# Patient Record
Sex: FEMALE | Race: BLACK OR AFRICAN AMERICAN | NOT HISPANIC OR LATINO | URBAN - METROPOLITAN AREA
[De-identification: names, ages, dates, MRNs, and addresses within clinical notes are randomized per-mention and may not be internally consistent; named-entity substitution may affect disease eponyms.]

---

## 2017-12-23 ENCOUNTER — INPATIENT (INPATIENT)
Facility: HOSPITAL | Age: 57
LOS: 2 days | Discharge: HOME HEALTH | DRG: 25 | End: 2017-12-26
Attending: NEUROLOGICAL SURGERY | Admitting: NEUROLOGICAL SURGERY
Payer: COMMERCIAL

## 2017-12-23 ENCOUNTER — TRANSCRIPTION ENCOUNTER (OUTPATIENT)
Age: 57
End: 2017-12-23

## 2017-12-23 VITALS
OXYGEN SATURATION: 100 % | TEMPERATURE: 98 F | HEART RATE: 75 BPM | SYSTOLIC BLOOD PRESSURE: 120 MMHG | DIASTOLIC BLOOD PRESSURE: 90 MMHG | RESPIRATION RATE: 18 BRPM

## 2017-12-23 DIAGNOSIS — I62.00 NONTRAUMATIC SUBDURAL HEMORRHAGE, UNSPECIFIED: ICD-10-CM

## 2017-12-23 LAB
ALBUMIN SERPL ELPH-MCNC: 4 G/DL — SIGNIFICANT CHANGE UP (ref 3.3–5)
ALP SERPL-CCNC: 55 U/L — SIGNIFICANT CHANGE UP (ref 40–120)
ALT FLD-CCNC: 14 U/L RC — SIGNIFICANT CHANGE UP (ref 10–45)
ANION GAP SERPL CALC-SCNC: 10 MMOL/L — SIGNIFICANT CHANGE UP (ref 5–17)
ANION GAP SERPL CALC-SCNC: 13 MMOL/L — SIGNIFICANT CHANGE UP (ref 5–17)
APTT BLD: 29.9 SEC — SIGNIFICANT CHANGE UP (ref 27.5–37.4)
AST SERPL-CCNC: 15 U/L — SIGNIFICANT CHANGE UP (ref 10–40)
BASOPHILS # BLD AUTO: 0 K/UL — SIGNIFICANT CHANGE UP (ref 0–0.2)
BASOPHILS NFR BLD AUTO: 0.4 % — SIGNIFICANT CHANGE UP (ref 0–2)
BILIRUB SERPL-MCNC: 0.3 MG/DL — SIGNIFICANT CHANGE UP (ref 0.2–1.2)
BLD GP AB SCN SERPL QL: NEGATIVE — SIGNIFICANT CHANGE UP
BUN SERPL-MCNC: 15 MG/DL — SIGNIFICANT CHANGE UP (ref 7–23)
BUN SERPL-MCNC: 16 MG/DL — SIGNIFICANT CHANGE UP (ref 7–23)
CALCIUM SERPL-MCNC: 9 MG/DL — SIGNIFICANT CHANGE UP (ref 8.4–10.5)
CALCIUM SERPL-MCNC: 9.5 MG/DL — SIGNIFICANT CHANGE UP (ref 8.4–10.5)
CHLORIDE SERPL-SCNC: 105 MMOL/L — SIGNIFICANT CHANGE UP (ref 96–108)
CHLORIDE SERPL-SCNC: 105 MMOL/L — SIGNIFICANT CHANGE UP (ref 96–108)
CO2 SERPL-SCNC: 23 MMOL/L — SIGNIFICANT CHANGE UP (ref 22–31)
CO2 SERPL-SCNC: 26 MMOL/L — SIGNIFICANT CHANGE UP (ref 22–31)
CREAT SERPL-MCNC: 0.87 MG/DL — SIGNIFICANT CHANGE UP (ref 0.5–1.3)
CREAT SERPL-MCNC: 0.98 MG/DL — SIGNIFICANT CHANGE UP (ref 0.5–1.3)
EOSINOPHIL # BLD AUTO: 0 K/UL — SIGNIFICANT CHANGE UP (ref 0–0.5)
EOSINOPHIL NFR BLD AUTO: 0.1 % — SIGNIFICANT CHANGE UP (ref 0–6)
GLUCOSE SERPL-MCNC: 162 MG/DL — HIGH (ref 70–99)
GLUCOSE SERPL-MCNC: 164 MG/DL — HIGH (ref 70–99)
HCG SERPL-ACNC: <2 MIU/ML — LOW (ref 5–24)
HCT VFR BLD CALC: 35.6 % — SIGNIFICANT CHANGE UP (ref 34.5–45)
HCT VFR BLD CALC: 37.2 % — SIGNIFICANT CHANGE UP (ref 34.5–45)
HGB BLD-MCNC: 12.4 G/DL — SIGNIFICANT CHANGE UP (ref 11.5–15.5)
HGB BLD-MCNC: 12.4 G/DL — SIGNIFICANT CHANGE UP (ref 11.5–15.5)
INR BLD: 1.17 RATIO — HIGH (ref 0.88–1.16)
LYMPHOCYTES # BLD AUTO: 2.3 K/UL — SIGNIFICANT CHANGE UP (ref 1–3.3)
LYMPHOCYTES # BLD AUTO: 24.8 % — SIGNIFICANT CHANGE UP (ref 13–44)
MAGNESIUM SERPL-MCNC: 1.8 MG/DL — SIGNIFICANT CHANGE UP (ref 1.6–2.6)
MCHC RBC-ENTMCNC: 30.1 PG — SIGNIFICANT CHANGE UP (ref 27–34)
MCHC RBC-ENTMCNC: 31.6 PG — SIGNIFICANT CHANGE UP (ref 27–34)
MCHC RBC-ENTMCNC: 33.4 GM/DL — SIGNIFICANT CHANGE UP (ref 32–36)
MCHC RBC-ENTMCNC: 34.9 GM/DL — SIGNIFICANT CHANGE UP (ref 32–36)
MCV RBC AUTO: 90.1 FL — SIGNIFICANT CHANGE UP (ref 80–100)
MCV RBC AUTO: 90.6 FL — SIGNIFICANT CHANGE UP (ref 80–100)
MONOCYTES # BLD AUTO: 0.6 K/UL — SIGNIFICANT CHANGE UP (ref 0–0.9)
MONOCYTES NFR BLD AUTO: 6.2 % — SIGNIFICANT CHANGE UP (ref 2–14)
NEUTROPHILS # BLD AUTO: 6.3 K/UL — SIGNIFICANT CHANGE UP (ref 1.8–7.4)
NEUTROPHILS NFR BLD AUTO: 68.4 % — SIGNIFICANT CHANGE UP (ref 43–77)
PHOSPHATE SERPL-MCNC: 3.3 MG/DL — SIGNIFICANT CHANGE UP (ref 2.5–4.5)
PLATELET # BLD AUTO: 260 K/UL — SIGNIFICANT CHANGE UP (ref 150–400)
PLATELET # BLD AUTO: 301 K/UL — SIGNIFICANT CHANGE UP (ref 150–400)
POTASSIUM SERPL-MCNC: 3.7 MMOL/L — SIGNIFICANT CHANGE UP (ref 3.5–5.3)
POTASSIUM SERPL-MCNC: 3.8 MMOL/L — SIGNIFICANT CHANGE UP (ref 3.5–5.3)
POTASSIUM SERPL-SCNC: 3.7 MMOL/L — SIGNIFICANT CHANGE UP (ref 3.5–5.3)
POTASSIUM SERPL-SCNC: 3.8 MMOL/L — SIGNIFICANT CHANGE UP (ref 3.5–5.3)
PROT SERPL-MCNC: 7.9 G/DL — SIGNIFICANT CHANGE UP (ref 6–8.3)
PROTHROM AB SERPL-ACNC: 12.8 SEC — HIGH (ref 9.8–12.7)
RBC # BLD: 3.93 M/UL — SIGNIFICANT CHANGE UP (ref 3.8–5.2)
RBC # BLD: 4.13 M/UL — SIGNIFICANT CHANGE UP (ref 3.8–5.2)
RBC # FLD: 13.1 % — SIGNIFICANT CHANGE UP (ref 10.3–14.5)
RBC # FLD: 13.2 % — SIGNIFICANT CHANGE UP (ref 10.3–14.5)
RH IG SCN BLD-IMP: POSITIVE — SIGNIFICANT CHANGE UP
SODIUM SERPL-SCNC: 141 MMOL/L — SIGNIFICANT CHANGE UP (ref 135–145)
SODIUM SERPL-SCNC: 141 MMOL/L — SIGNIFICANT CHANGE UP (ref 135–145)
WBC # BLD: 12.3 K/UL — HIGH (ref 3.8–10.5)
WBC # BLD: 9.2 K/UL — SIGNIFICANT CHANGE UP (ref 3.8–10.5)
WBC # FLD AUTO: 12.3 K/UL — HIGH (ref 3.8–10.5)
WBC # FLD AUTO: 9.2 K/UL — SIGNIFICANT CHANGE UP (ref 3.8–10.5)

## 2017-12-23 PROCEDURE — 99291 CRITICAL CARE FIRST HOUR: CPT

## 2017-12-23 PROCEDURE — 70496 CT ANGIOGRAPHY HEAD: CPT | Mod: 26

## 2017-12-23 PROCEDURE — 93010 ELECTROCARDIOGRAM REPORT: CPT

## 2017-12-23 PROCEDURE — 99233 SBSQ HOSP IP/OBS HIGH 50: CPT

## 2017-12-23 PROCEDURE — 61312 CRNEC/CRNOT STTL XDRL/SDRL: CPT

## 2017-12-23 PROCEDURE — 70450 CT HEAD/BRAIN W/O DYE: CPT | Mod: 26,59

## 2017-12-23 PROCEDURE — 99285 EMERGENCY DEPT VISIT HI MDM: CPT | Mod: 25

## 2017-12-23 RX ORDER — DEXTROSE MONOHYDRATE, SODIUM CHLORIDE, AND POTASSIUM CHLORIDE 50; .745; 4.5 G/1000ML; G/1000ML; G/1000ML
1000 INJECTION, SOLUTION INTRAVENOUS
Refills: 0 | Status: DISCONTINUED | OUTPATIENT
Start: 2017-12-23 | End: 2017-12-24

## 2017-12-23 RX ORDER — ATORVASTATIN CALCIUM 80 MG/1
10 TABLET, FILM COATED ORAL AT BEDTIME
Refills: 0 | Status: DISCONTINUED | OUTPATIENT
Start: 2017-12-23 | End: 2017-12-26

## 2017-12-23 RX ORDER — HYDRALAZINE HCL 50 MG
5 TABLET ORAL ONCE
Refills: 0 | Status: COMPLETED | OUTPATIENT
Start: 2017-12-23 | End: 2017-12-23

## 2017-12-23 RX ORDER — MAGNESIUM SULFATE 500 MG/ML
1 VIAL (ML) INJECTION ONCE
Refills: 0 | Status: COMPLETED | OUTPATIENT
Start: 2017-12-23 | End: 2017-12-24

## 2017-12-23 RX ORDER — DESMOPRESSIN ACETATE 0.1 MG/1
20 TABLET ORAL ONCE
Refills: 0 | Status: COMPLETED | OUTPATIENT
Start: 2017-12-23 | End: 2017-12-23

## 2017-12-23 RX ORDER — HYDRALAZINE HCL 50 MG
5 TABLET ORAL ONCE
Refills: 0 | Status: DISCONTINUED | OUTPATIENT
Start: 2017-12-23 | End: 2017-12-23

## 2017-12-23 RX ORDER — SENNA PLUS 8.6 MG/1
2 TABLET ORAL AT BEDTIME
Refills: 0 | Status: DISCONTINUED | OUTPATIENT
Start: 2017-12-23 | End: 2017-12-26

## 2017-12-23 RX ORDER — OXYCODONE HYDROCHLORIDE 5 MG/1
10 TABLET ORAL EVERY 4 HOURS
Refills: 0 | Status: DISCONTINUED | OUTPATIENT
Start: 2017-12-23 | End: 2017-12-26

## 2017-12-23 RX ORDER — INFLUENZA VIRUS VACCINE 15; 15; 15; 15 UG/.5ML; UG/.5ML; UG/.5ML; UG/.5ML
0.5 SUSPENSION INTRAMUSCULAR ONCE
Refills: 0 | Status: DISCONTINUED | OUTPATIENT
Start: 2017-12-23 | End: 2017-12-26

## 2017-12-23 RX ORDER — OXYCODONE HYDROCHLORIDE 5 MG/1
5 TABLET ORAL EVERY 4 HOURS
Refills: 0 | Status: DISCONTINUED | OUTPATIENT
Start: 2017-12-23 | End: 2017-12-26

## 2017-12-23 RX ORDER — HYDROCHLOROTHIAZIDE 25 MG
12.5 TABLET ORAL DAILY
Refills: 0 | Status: DISCONTINUED | OUTPATIENT
Start: 2017-12-23 | End: 2017-12-23

## 2017-12-23 RX ORDER — NICARDIPINE HYDROCHLORIDE 30 MG/1
3 CAPSULE, EXTENDED RELEASE ORAL
Qty: 40 | Refills: 0 | Status: DISCONTINUED | OUTPATIENT
Start: 2017-12-23 | End: 2017-12-24

## 2017-12-23 RX ORDER — ATENOLOL 25 MG/1
25 TABLET ORAL DAILY
Refills: 0 | Status: DISCONTINUED | OUTPATIENT
Start: 2017-12-23 | End: 2017-12-23

## 2017-12-23 RX ORDER — LEVETIRACETAM 250 MG/1
500 TABLET, FILM COATED ORAL EVERY 12 HOURS
Refills: 0 | Status: DISCONTINUED | OUTPATIENT
Start: 2017-12-23 | End: 2017-12-26

## 2017-12-23 RX ORDER — DOCUSATE SODIUM 100 MG
100 CAPSULE ORAL DAILY
Refills: 0 | Status: DISCONTINUED | OUTPATIENT
Start: 2017-12-23 | End: 2017-12-26

## 2017-12-23 RX ORDER — ATENOLOL 25 MG/1
25 TABLET ORAL DAILY
Refills: 0 | Status: DISCONTINUED | OUTPATIENT
Start: 2017-12-23 | End: 2017-12-26

## 2017-12-23 RX ORDER — ACETAMINOPHEN 500 MG
1000 TABLET ORAL ONCE
Refills: 0 | Status: COMPLETED | OUTPATIENT
Start: 2017-12-23 | End: 2017-12-23

## 2017-12-23 RX ORDER — ONDANSETRON 8 MG/1
4 TABLET, FILM COATED ORAL ONCE
Refills: 0 | Status: COMPLETED | OUTPATIENT
Start: 2017-12-23 | End: 2017-12-23

## 2017-12-23 RX ORDER — DESMOPRESSIN ACETATE 0.1 MG/1
20 TABLET ORAL ONCE
Refills: 0 | Status: DISCONTINUED | OUTPATIENT
Start: 2017-12-23 | End: 2017-12-23

## 2017-12-23 RX ADMIN — ATENOLOL 25 MILLIGRAM(S): 25 TABLET ORAL at 16:44

## 2017-12-23 RX ADMIN — OXYCODONE HYDROCHLORIDE 10 MILLIGRAM(S): 5 TABLET ORAL at 23:30

## 2017-12-23 RX ADMIN — Medication 5 MILLIGRAM(S): at 15:15

## 2017-12-23 RX ADMIN — DESMOPRESSIN ACETATE 220 MICROGRAM(S): 0.1 TABLET ORAL at 07:36

## 2017-12-23 RX ADMIN — ONDANSETRON 4 MILLIGRAM(S): 8 TABLET, FILM COATED ORAL at 15:15

## 2017-12-23 RX ADMIN — Medication 100 MILLIGRAM(S): at 14:38

## 2017-12-23 RX ADMIN — ATORVASTATIN CALCIUM 10 MILLIGRAM(S): 80 TABLET, FILM COATED ORAL at 22:01

## 2017-12-23 RX ADMIN — DEXTROSE MONOHYDRATE, SODIUM CHLORIDE, AND POTASSIUM CHLORIDE 75 MILLILITER(S): 50; .745; 4.5 INJECTION, SOLUTION INTRAVENOUS at 18:57

## 2017-12-23 RX ADMIN — Medication 400 MILLIGRAM(S): at 14:31

## 2017-12-23 RX ADMIN — Medication 100 MILLIGRAM(S): at 22:01

## 2017-12-23 RX ADMIN — Medication 100 MILLIGRAM(S): at 17:42

## 2017-12-23 RX ADMIN — Medication 1000 MILLIGRAM(S): at 15:31

## 2017-12-23 RX ADMIN — LEVETIRACETAM 500 MILLIGRAM(S): 250 TABLET, FILM COATED ORAL at 17:42

## 2017-12-23 NOTE — H&P ADULT - PMH
Diabetes mellitus of other type without complication, unspecified long term insulin use status    Essential hypertension    Pure hypercholesterolemia

## 2017-12-23 NOTE — ED PROVIDER NOTE - NOTES
level 1 called on arrival of transfer - downgraded to level 3 - pt denies any trauma - chronic ha - worse x 3days w n/v - no anticoag - nsx called and aware

## 2017-12-23 NOTE — ED PROVIDER NOTE - MEDICAL DECISION MAKING DETAILS
estuardo transfer from [facility] - atraumatic sdh w shift nonfocal exam - no reported trauma, no anticoag, asa only - consider ddavp , repeat ct head - consider cta r/o vasc anom, admit to nsx possib op internvention

## 2017-12-23 NOTE — ED PROVIDER NOTE - OBJECTIVE STATEMENT
christine 57 f w htn , dm ,high chol reportedly noncompliant w bp meds - presents as transfer from [facility] - pt w ha x weeks - worse x 3 days , inc confusion, inc sleeping, ha 10/10 w viomiting last pm - brought to [facility] 2 cm sdh w 1.4 cm shift - denies any trauma , no anticoag, pt taking asa- no numbness or weakness persistant ha bitemporal , pos nauseau vomiting only x1 , no fever

## 2017-12-23 NOTE — H&P ADULT - PROBLEM SELECTOR PLAN 1
Preop for OR  Left craniotomy for SDH  PLT/ DDAVP  CT / CTA (to r/o vascular malformation)  Q1 neurochecks

## 2017-12-23 NOTE — PROGRESS NOTE ADULT - SUBJECTIVE AND OBJECTIVE BOX
LEVEL 1 to 2  TRAUMA ACTIVATION Consult    57y Female transfer from [facility] for an atraumatic SDH.  She has had a 3 day hx of headaches and forgetfulness. The patient has had no prior history of head trauma or loss of consciousness.  At [facility] the patient was found to have a 1.2 SDH with 15 mm midline shift. She has no complaints of pain other than headache.       Primary Survey  A - intact   B - clear to auscultation and equal b/l   C - 120 / 90 HR 74    D - GCS  E - Exposure obtained      Secondary survey  GEN: NAD A/O x 2, alert and oriented to person and place, responses appropriate  HEENT: normocephalic, atraumatic, no outward signs of trauma  CV: s1, s2, RRR  PULM/CHEST: CTA B/L, no crepitus, no obvious signs of trauma  ABD: soft, nontender, nondistended, no hematomas, no abrasions or lacerations  : deferred  EXT: cool feet, others warm, no gross deformities, soft compartments, no abrasions         PMH  Diabetes mellitus of other type without complication, unspecified long term insulin use status  Pure hypercholesterolemia  Essential hypertension  No pertinent past medical history      PSH  No significant past surgical history      MEDS  MEDICATIONS  (STANDING):  MEDICATIONS  (PRN):      ALLERGIES  Allergies    penicillin (Rash)    Intolerances        SOCIAL Hx: noncontributory    LABS                        12.4   9.2   )-----------( 260      ( 23 Dec 2017 07:17 )             37.2     12-23    141  |  105  |  16  ----------------------------<  162<H>  3.8   |  26  |  0.98    Ca    9.5      23 Dec 2017 07:17    TPro  7.9  /  Alb  4.0  /  TBili  0.3  /  DBili  x   /  AST  15  /  ALT  14  /  AlkPhos  55  12-23    PT/INR - ( 23 Dec 2017 07:17 )   PT: 12.8 sec;   INR: 1.17 ratio         PTT - ( 23 Dec 2017 07:17 )  PTT:29.9 sec          IMAGING  < from: CT Angio Head w/ IV Cont (12.23.17 @ 07:31) >  IMPRESSION:     CTABRAIN: Patent intracranial circulation without flow-limiting stenosis   or occlusion.   Moderate stenosis of the left cervical internal carotid artery, partially   imaged.   Retropharyngeal course of the bilateral cervical internal carotid   arteries.    < end of copied text > LEVEL 1 to 2  TRAUMA ACTIVATION Consult    57y Female transfer from [facility] for an atraumatic SDH.  She has had a 3 day hx of headaches and forgetfulness. The patient has had no prior history of head trauma or loss of consciousness.  At [facility] the patient was found to have a 1.2 SDH with 15 mm midline shift. She has no complaints of pain other than headache.       Primary Survey  A - intact   B - clear to auscultation and equal b/l   C - 120 / 90 HR 74    D - GCS 14  E - Exposure obtained      Secondary survey  GEN: NAD A/O x 2, alert and oriented to person and place, responses appropriate  HEENT: normocephalic, atraumatic, no outward signs of trauma  CV: s1, s2, RRR  PULM/CHEST: CTA B/L, no crepitus, no obvious signs of trauma  ABD: soft, nontender, nondistended, no hematomas, no abrasions or lacerations  : deferred  EXT: cool feet, others warm, no gross deformities, soft compartments, no abrasions         PMH  Diabetes mellitus of other type without complication, unspecified long term insulin use status  Pure hypercholesterolemia  Essential hypertension  No pertinent past medical history      PSH  No significant past surgical history      MEDS  MEDICATIONS  (STANDING):  MEDICATIONS  (PRN):      ALLERGIES  Allergies    penicillin (Rash)    Intolerances        SOCIAL Hx: noncontributory    LABS                        12.4   9.2   )-----------( 260      ( 23 Dec 2017 07:17 )             37.2     12-23    141  |  105  |  16  ----------------------------<  162<H>  3.8   |  26  |  0.98    Ca    9.5      23 Dec 2017 07:17    TPro  7.9  /  Alb  4.0  /  TBili  0.3  /  DBili  x   /  AST  15  /  ALT  14  /  AlkPhos  55  12-23    PT/INR - ( 23 Dec 2017 07:17 )   PT: 12.8 sec;   INR: 1.17 ratio         PTT - ( 23 Dec 2017 07:17 )  PTT:29.9 sec          IMAGING  < from: CT Angio Head w/ IV Cont (12.23.17 @ 07:31) >  IMPRESSION:     CTABRAIN: Patent intracranial circulation without flow-limiting stenosis   or occlusion.   Moderate stenosis of the left cervical internal carotid artery, partially   imaged.   Retropharyngeal course of the bilateral cervical internal carotid   arteries.    < end of copied text >

## 2017-12-23 NOTE — CONSULT NOTE ADULT - SUBJECTIVE AND OBJECTIVE BOX
LEVEL 1 to 2  TRAUMA ACTIVATION Consult    57y Female transfer from [facility] for an atraumatic SDH.  She has had a 3 day hx of headaches, lethargic and forgetfulness. The patient has had no prior history of head trauma or loss of consciousness.  At [facility] the patient was found to have a 1.2 SDH with 15 mm midline shift. She has no complaints of pain other than headache. *Shift found to be 17mm on repeat imaging.  Patient takes ASA 81 mg everyday.  Daughter and son are at patient's bedside. Neurosurgery evaluated the patient, DDAVP and platelets given.       Primary Survey  A - intact   B - clear to auscultation and equal b/l   C - 120 / 90 HR 74    D - GCS 15  E - Exposure obtained      Secondary survey  GEN: NAD A/O x 2, alert and oriented to person and place, responses appropriate  HEENT: normocephalic, atraumatic, no outward signs of trauma  CV: s1, s2, RRR  PULM/CHEST: CTA B/L, no crepitus, no obvious signs of trauma  ABD: soft, nontender, nondistended, no hematomas, no abrasions or lacerations  : deferred  EXT: cool feet, others warm, no gross deformities, soft compartments, no abrasions   Neuro: L grift       PMH  Diabetes mellitus of other type without complication, unspecified long term insulin use status  Pure hypercholesterolemia  Essential hypertension  No pertinent past medical history      PSH  No significant past surgical history      MEDS  MEDICATIONS  (STANDING):  MEDICATIONS  (PRN):      ALLERGIES  Allergies    penicillin (Rash)    Intolerances        SOCIAL Hx: noncontributory    LABS                        12.4   9.2   )-----------( 260      ( 23 Dec 2017 07:17 )             37.2     12-23    141  |  105  |  16  ----------------------------<  162<H>  3.8   |  26  |  0.98    Ca    9.5      23 Dec 2017 07:17    TPro  7.9  /  Alb  4.0  /  TBili  0.3  /  DBili  x   /  AST  15  /  ALT  14  /  AlkPhos  55  12-23    PT/INR - ( 23 Dec 2017 07:17 )   PT: 12.8 sec;   INR: 1.17 ratio         PTT - ( 23 Dec 2017 07:17 )  PTT:29.9 sec          IMAGING  < from: CT Angio Head w/ IV Cont (12.23.17 @ 07:31) >  IMPRESSION:     CTABRAIN: Patent intracranial circulation without flow-limiting stenosis   or occlusion.   Moderate stenosis of the left cervical internal carotid artery, partially   imaged.   Retropharyngeal course of the bilateral cervical internal carotid   arteries.    < end of copied text > LEVEL 1 to 3  TRAUMA ACTIVATION Consult    57y Female transfer from [facility] for an atraumatic SDH.  She has had a 3 day hx of moderate headaches, lethargy and forgetfulness. No N/V. The patient has had no prior history of head trauma or loss of consciousness.  At [facility] the patient was found to have a 1.2 SDH with 15 mm midline shift. She has no complaints of pain other than headache. *Shift found to be 17mm on repeat imaging.  Patient takes ASA 81 mg everyday.  Daughter and son are at patient's bedside. Neurosurgery evaluated the patient, DDAVP and platelets given.       Primary Survey  A - intact   B - clear to auscultation and equal b/l   C - 120 / 90 HR 74    D - GCS 15  E - Exposure obtained      Secondary survey  GEN: NAD  Neuro: A/O x 2, alert and oriented to person and place, responses appropriate  HEENT: normocephalic, atraumatic, no outward signs of trauma. Neck: no C-spine tenderness. Pt did not have C-collar on at arrival.  Eyes: EOMI  CV: s1, s2, RRR  PULM/CHEST: CTA B/L, no crepitus, no obvious signs of trauma  ABD: soft, nontender, nondistended, no hematomas, no abrasions or lacerations  EXT: cool feet, others warm, no gross deformities, soft compartments, no abrasions   Neuro: L drift of hands  Psych: normal affect      PMH  Diabetes mellitus of other type without complication, unspecified long term insulin use status  Pure hypercholesterolemia  Essential hypertension      PSH  Neck and back surgery, ORIF LE      MEDS  MEDICATIONS  (STANDING):  MEDICATIONS  (PRN):      ALLERGIES    penicillin (Rash)      SOCIAL Hx: noncontributory    LABS                        12.4   9.2   )-----------( 260      ( 23 Dec 2017 07:17 )             37.2     12-23    141  |  105  |  16  ----------------------------<  162<H>  3.8   |  26  |  0.98    Ca    9.5      23 Dec 2017 07:17    TPro  7.9  /  Alb  4.0  /  TBili  0.3  /  DBili  x   /  AST  15  /  ALT  14  /  AlkPhos  55  12-23    PT/INR - ( 23 Dec 2017 07:17 )   PT: 12.8 sec;   INR: 1.17 ratio         PTT - ( 23 Dec 2017 07:17 )  PTT:29.9 sec          IMAGING  < from: CT Angio Head w/ IV Cont (12.23.17 @ 07:31) >  IMPRESSION:     CTABRAIN: Patent intracranial circulation without flow-limiting stenosis   or occlusion.   Moderate stenosis of the left cervical internal carotid artery, partially   imaged.   Retropharyngeal course of the bilateral cervical internal carotid   arteries.    < end of copied text > LEVEL 1 to 3  TRAUMA ACTIVATION Consult    57y Female transfer from [facility] for an atraumatic SDH.  She has had a 3 day hx of moderate headaches, lethargy and forgetfulness. No N/V. The patient has had no prior history of head trauma or loss of consciousness.  At [facility] the patient was found to have a 1.2 SDH with 15 mm midline shift. She has no complaints of pain other than headache. *Shift found to be 17mm on repeat imaging.  Patient takes ASA 81 mg everyday.  Daughter and son are at patient's bedside. Neurosurgery evaluated the patient, DDAVP and platelets given.       Primary Survey  A - intact   B - clear to auscultation and equal b/l   C - 120 / 90 HR 74    D - GCS 14 (disoriented to time)  E - Exposure obtained      Secondary survey  GEN: NAD  Neuro: A/O x 2, alert and oriented to person and place, responses appropriate  HEENT: normocephalic, atraumatic, no outward signs of trauma. Neck: no C-spine tenderness. Pt did not have C-collar on at arrival.  Eyes: EOMI  CV: s1, s2, RRR  PULM/CHEST: CTA B/L, no crepitus, no obvious signs of trauma  ABD: soft, nontender, nondistended, no hematomas, no abrasions or lacerations  EXT: cool feet, others warm, no gross deformities, soft compartments, no abrasions   Neuro: L drift of hands  Psych: normal affect      PMH  Diabetes mellitus of other type without complication, unspecified long term insulin use status  Pure hypercholesterolemia  Essential hypertension      PSH  Neck and back surgery, ORIF LE      MEDS  MEDICATIONS  (STANDING):  MEDICATIONS  (PRN):      ALLERGIES    penicillin (Rash)      SOCIAL Hx: noncontributory    LABS                        12.4   9.2   )-----------( 260      ( 23 Dec 2017 07:17 )             37.2     12-23    141  |  105  |  16  ----------------------------<  162<H>  3.8   |  26  |  0.98    Ca    9.5      23 Dec 2017 07:17    TPro  7.9  /  Alb  4.0  /  TBili  0.3  /  DBili  x   /  AST  15  /  ALT  14  /  AlkPhos  55  12-23    PT/INR - ( 23 Dec 2017 07:17 )   PT: 12.8 sec;   INR: 1.17 ratio         PTT - ( 23 Dec 2017 07:17 )  PTT:29.9 sec          IMAGING  < from: CT Angio Head w/ IV Cont (12.23.17 @ 07:31) >  IMPRESSION:     CTABRAIN: Patent intracranial circulation without flow-limiting stenosis   or occlusion.   Moderate stenosis of the left cervical internal carotid artery, partially   imaged.   Retropharyngeal course of the bilateral cervical internal carotid   arteries.    < end of copied text >

## 2017-12-23 NOTE — PROGRESS NOTE ADULT - SUBJECTIVE AND OBJECTIVE BOX
HPI:  57 year old female with history of HTN, DM, HLD , bariatric surgery, non-compliant with blood pressure medications presents as transfer from [facility]. Per family has been having headache for last 3 days, increased confusion, forgetfulness that is progressive in nature. Was seen at [facility] with 2 cm left subacute SDH with 1.4cm shift. Patient takes ASA at baseline as prophylaxis. At [facility] ED, patient is alert, pleasant, oriented x 2, is not able to recall date with right weakness. Son and daughter at bedside. Patient to receive DDAVP / PLT. Preop for OR for left craniotomy. Repeat CT / CTA here.     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 Son is . (23 Dec 2017 07:50)    Adm NSCU s/p L crani for subacute SDH evac    VITALS:  T(C): , Max: 36.7 (12-23-17 @ 06:55)  HR:  (75 - 76)  BP:  (120/90 - 146/81)  ABP: --  RR:  (18 - 18)  SpO2:  (100% - 100%)  Wt(kg): --    LABS:  Na: 141 (12-23 @ 07:17)  K: 3.8 (12-23 @ 07:17)  Cl: 105 (12-23 @ 07:17)  CO2: 26 (12-23 @ 07:17)  BUN: 16 (12-23 @ 07:17)  Cr: 0.98 (12-23 @ 07:17)  Glu: 162(12-23 @ 07:17)    Hgb: 12.4 (12-23 @ 07:17)  Hct: 37.2 (12-23 @ 07:17)  WBC: 9.2 (12-23 @ 07:17)  Plt: 260 (12-23 @ 07:17)  PT: 12.8 (12-23 @ 07:17)  INR: 1.17 (12-23 @ 07:17)  aPTT: 29.9 (12-23 @ 07:17)    IMAGING:   Recent imaging studies were reviewed.    MEDICATIONS:  ATENolol  Tablet 25 milliGRAM(s) Oral daily  atorvastatin 10 milliGRAM(s) Oral at bedtime  clindamycin IVPB 300 milliGRAM(s) IV Intermittent every 8 hours  hydrochlorothiazide 12.5 milliGRAM(s) Oral daily  levETIRAcetam 500 milliGRAM(s) Oral every 12 hours  sodium chloride 0.9% with potassium chloride 20 mEq/L 1000 milliLiter(s) IV Continuous <Continuous>    EXAMINATION:  General:  calm  HEENT:  MMM  Neuro:  awake, alert, oriented to name/hospital/year however not the name of the hospital, follows commands, moves all extremities full strength, no drift  Cards:  RRR  Respiratory:  no respiratory distress  Adomen:  soft  Extremities:  no edema  Skin:  warm/dry

## 2017-12-23 NOTE — CONSULT NOTE ADULT - ASSESSMENT
Patient is a 57F with an atraumatic SDH and 17mm shift going to the OR for cranial decompression.    -DDAVP and platelets   -management as per neurosurgery  -nontraumatic injuries  -discussed with Dr. RADHA Cifuentes MD PGYII [id number]

## 2017-12-23 NOTE — PROGRESS NOTE ADULT - ASSESSMENT
Summary: POD#0 L crani for subacute SDH evacuation    NEURO:  q1h neuro checks, CTH in am, monitor SD drain output, keppra for seizure ppx x7d, pain control, no sedation  PT/OT/OOB as tolerated    CARDS:  -150--cont thi emeds    PULM:  sat > 92%    RENAL:  IVL once tolerating adequate PO    GASTRO:  advance as tolerated  ---> Stress ulcer prophylaxis:  PPI not indicated    HEME:  monitor H/H    ---> DVT prophylaxis: SCDs, hold anticoagulation, fresh post op    ENDO:  euglycemia    ID:  afebrile    Code status:  Full code  Disposition:  ICU    This patient was at high risk of neurologic deterioration and/or death due to: reaccumulation of SDH, brain compression    Time spent:  45 minutes

## 2017-12-23 NOTE — H&P ADULT - NSHPPHYSICALEXAM_GEN_ALL_CORE
Alert  Oriented x 2 (unable to recall date)  pupils equal and reactive, 3mm bilaterally  RUE 5/5  LUE 4+/5  Bilateral lowers 4+5 Alert  Oriented x 2 (unable to recall date)  pupils equal and reactive, 3mm bilaterally  RUE 4+/5  LUE 5/5  Bilateral lowers 4+5

## 2017-12-23 NOTE — ED PROVIDER NOTE - PHYSICAL EXAMINATION
setuardo aaox3 bnad ncat perrl eomi gcs15 s1s2 rrrctabl abd soft nt pelv stabel stregth 5/5 uele bl sensation intact - no evidence of trauma on physical exam [id number]

## 2017-12-23 NOTE — CONSULT NOTE ADULT - ATTENDING COMMENTS
Pt seen and examined today at 7am. Initially level one trauma called for large left SDH with 1.7cm shift, but when I arrived, Dr. Carvajal (ED attending) indicated that as there was no traumatic mechanism, he had downgraded this to a trauma consultation only (level 3). Patient was awake and alert, in no distress, with her family at bedside. Airway clear, breathing normal, hemodynamically normal. GCS 15, A&Ox2, PERRL, no lateralizing signs. Full exposure.    CT head/ CTA head repeated. I personally reviewed the images, which demonstrated a large left SDH with 1.7cm shift and evidence of uncal herniation; patient was taken to OR for decompressive craniectomy by Neurosurgery. Unclear cause of SDH as patient and her family were very clear that she had not fallen or hit her head and patient only on ASA, which is extremely unlikely to cause major bleeding spontaneously or with minimal impact). Care per Neurosurgery.

## 2017-12-23 NOTE — H&P ADULT - HISTORY OF PRESENT ILLNESS
57 year old female with history of HTN, DM, HLD , bariatric surgery, non-compliant with blood pressure medications presents as transfer from [facility]. Per family has been having headache for last 3 days, increased confusion, forgetfulness that is progressive in nature. Was seen at [place] with 2 cm left subacute SDH with 1.4cm shift. Patient takes ASA at baseline as prophylaxis. At [facility] ED, patient is alert, pleasant, oriented x 2, is not able to recall date with left weakness. Son and daughter at bedside. Patient to receive DDAVP / PLT. Preop for OR for left craniotomy. Repeat CT / CTA here.     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 Son is . 57 year old female with history of HTN, DM, HLD , bariatric surgery, non-compliant with blood pressure medications presents as transfer from [facility]. Per family has been having headache for last 3 days, increased confusion, forgetfulness that is progressive in nature. Was seen at [place] with 2 cm left subacute SDH with 1.4cm shift. Patient takes ASA at baseline as prophylaxis. At [facility] ED, patient is alert, pleasant, oriented x 2, is not able to recall date with right weakness. Son and daughter at bedside. Patient to receive DDAVP / PLT. Preop for OR for left craniotomy. Repeat CT / CTA here.     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 Son is .

## 2017-12-24 LAB
ANION GAP SERPL CALC-SCNC: 12 MMOL/L — SIGNIFICANT CHANGE UP (ref 5–17)
BUN SERPL-MCNC: 13 MG/DL — SIGNIFICANT CHANGE UP (ref 7–23)
CALCIUM SERPL-MCNC: 8.6 MG/DL — SIGNIFICANT CHANGE UP (ref 8.4–10.5)
CHLORIDE SERPL-SCNC: 106 MMOL/L — SIGNIFICANT CHANGE UP (ref 96–108)
CK MB BLD-MCNC: 0.7 % — SIGNIFICANT CHANGE UP (ref 0–3.5)
CK MB CFR SERPL CALC: 1.5 NG/ML — SIGNIFICANT CHANGE UP (ref 0–3.8)
CK SERPL-CCNC: 216 U/L — HIGH (ref 25–170)
CO2 SERPL-SCNC: 23 MMOL/L — SIGNIFICANT CHANGE UP (ref 22–31)
CREAT SERPL-MCNC: 0.83 MG/DL — SIGNIFICANT CHANGE UP (ref 0.5–1.3)
GLUCOSE BLDC GLUCOMTR-MCNC: 157 MG/DL — HIGH (ref 70–99)
GLUCOSE SERPL-MCNC: 150 MG/DL — HIGH (ref 70–99)
HCT VFR BLD CALC: 34.1 % — LOW (ref 34.5–45)
HGB BLD-MCNC: 11.6 G/DL — SIGNIFICANT CHANGE UP (ref 11.5–15.5)
MAGNESIUM SERPL-MCNC: 2 MG/DL — SIGNIFICANT CHANGE UP (ref 1.6–2.6)
MCHC RBC-ENTMCNC: 30.8 PG — SIGNIFICANT CHANGE UP (ref 27–34)
MCHC RBC-ENTMCNC: 34 GM/DL — SIGNIFICANT CHANGE UP (ref 32–36)
MCV RBC AUTO: 90.6 FL — SIGNIFICANT CHANGE UP (ref 80–100)
PHOSPHATE SERPL-MCNC: 3 MG/DL — SIGNIFICANT CHANGE UP (ref 2.5–4.5)
PLATELET # BLD AUTO: 274 K/UL — SIGNIFICANT CHANGE UP (ref 150–400)
POTASSIUM SERPL-MCNC: 3.8 MMOL/L — SIGNIFICANT CHANGE UP (ref 3.5–5.3)
POTASSIUM SERPL-SCNC: 3.8 MMOL/L — SIGNIFICANT CHANGE UP (ref 3.5–5.3)
RBC # BLD: 3.77 M/UL — LOW (ref 3.8–5.2)
RBC # FLD: 13.3 % — SIGNIFICANT CHANGE UP (ref 10.3–14.5)
SODIUM SERPL-SCNC: 141 MMOL/L — SIGNIFICANT CHANGE UP (ref 135–145)
TROPONIN T SERPL-MCNC: <0.01 NG/ML — SIGNIFICANT CHANGE UP (ref 0–0.06)
TROPONIN T SERPL-MCNC: <0.01 NG/ML — SIGNIFICANT CHANGE UP (ref 0–0.06)
WBC # BLD: 10.3 K/UL — SIGNIFICANT CHANGE UP (ref 3.8–10.5)
WBC # FLD AUTO: 10.3 K/UL — SIGNIFICANT CHANGE UP (ref 3.8–10.5)

## 2017-12-24 PROCEDURE — 93010 ELECTROCARDIOGRAM REPORT: CPT

## 2017-12-24 PROCEDURE — 99291 CRITICAL CARE FIRST HOUR: CPT

## 2017-12-24 PROCEDURE — 70450 CT HEAD/BRAIN W/O DYE: CPT | Mod: 26

## 2017-12-24 PROCEDURE — 99292 CRITICAL CARE ADDL 30 MIN: CPT

## 2017-12-24 RX ORDER — ENOXAPARIN SODIUM 100 MG/ML
40 INJECTION SUBCUTANEOUS
Refills: 0 | Status: DISCONTINUED | OUTPATIENT
Start: 2017-12-24 | End: 2017-12-26

## 2017-12-24 RX ORDER — INSULIN LISPRO 100/ML
VIAL (ML) SUBCUTANEOUS
Refills: 0 | Status: DISCONTINUED | OUTPATIENT
Start: 2017-12-24 | End: 2017-12-26

## 2017-12-24 RX ORDER — ACETAMINOPHEN 500 MG
1000 TABLET ORAL ONCE
Refills: 0 | Status: COMPLETED | OUTPATIENT
Start: 2017-12-24 | End: 2017-12-24

## 2017-12-24 RX ADMIN — OXYCODONE HYDROCHLORIDE 10 MILLIGRAM(S): 5 TABLET ORAL at 12:27

## 2017-12-24 RX ADMIN — DEXTROSE MONOHYDRATE, SODIUM CHLORIDE, AND POTASSIUM CHLORIDE 75 MILLILITER(S): 50; .745; 4.5 INJECTION, SOLUTION INTRAVENOUS at 05:56

## 2017-12-24 RX ADMIN — LEVETIRACETAM 500 MILLIGRAM(S): 250 TABLET, FILM COATED ORAL at 05:56

## 2017-12-24 RX ADMIN — ATORVASTATIN CALCIUM 10 MILLIGRAM(S): 80 TABLET, FILM COATED ORAL at 23:07

## 2017-12-24 RX ADMIN — Medication 100 MILLIGRAM(S): at 12:14

## 2017-12-24 RX ADMIN — SENNA PLUS 2 TABLET(S): 8.6 TABLET ORAL at 23:07

## 2017-12-24 RX ADMIN — DEXTROSE MONOHYDRATE, SODIUM CHLORIDE, AND POTASSIUM CHLORIDE 75 MILLILITER(S): 50; .745; 4.5 INJECTION, SOLUTION INTRAVENOUS at 00:45

## 2017-12-24 RX ADMIN — OXYCODONE HYDROCHLORIDE 10 MILLIGRAM(S): 5 TABLET ORAL at 16:45

## 2017-12-24 RX ADMIN — OXYCODONE HYDROCHLORIDE 10 MILLIGRAM(S): 5 TABLET ORAL at 10:56

## 2017-12-24 RX ADMIN — NICARDIPINE HYDROCHLORIDE 15 MG/HR: 30 CAPSULE, EXTENDED RELEASE ORAL at 05:57

## 2017-12-24 RX ADMIN — Medication 100 GRAM(S): at 00:28

## 2017-12-24 RX ADMIN — Medication 400 MILLIGRAM(S): at 20:20

## 2017-12-24 RX ADMIN — Medication 1: at 23:52

## 2017-12-24 RX ADMIN — ENOXAPARIN SODIUM 40 MILLIGRAM(S): 100 INJECTION SUBCUTANEOUS at 17:04

## 2017-12-24 RX ADMIN — OXYCODONE HYDROCHLORIDE 10 MILLIGRAM(S): 5 TABLET ORAL at 17:15

## 2017-12-24 RX ADMIN — ATENOLOL 25 MILLIGRAM(S): 25 TABLET ORAL at 05:55

## 2017-12-24 RX ADMIN — Medication 1000 MILLIGRAM(S): at 20:50

## 2017-12-24 RX ADMIN — LEVETIRACETAM 500 MILLIGRAM(S): 250 TABLET, FILM COATED ORAL at 17:04

## 2017-12-24 RX ADMIN — OXYCODONE HYDROCHLORIDE 10 MILLIGRAM(S): 5 TABLET ORAL at 00:00

## 2017-12-24 RX ADMIN — Medication 100 MILLIGRAM(S): at 05:55

## 2017-12-24 NOTE — PROGRESS NOTE ADULT - ASSESSMENT
Left SDH status post evacuation, post-operative day 1  - Monitor drain output  - Seizure prophylaxis x 7 days  - Pain control    Additional 45 minutes critical care time

## 2017-12-24 NOTE — PROGRESS NOTE ADULT - ASSESSMENT
subdural hematoma, post-operative day 1  - CT Head in AM  - Seizure prophylaxis x 7 days  - Monitor drain output  - OOB/mobilize    At risk of neurologic decline or death due to brain compression    35 minutes critical care time

## 2017-12-24 NOTE — PROGRESS NOTE ADULT - SUBJECTIVE AND OBJECTIVE BOX
HPI:  57 year old female with history of HTN, DM, HLD , bariatric surgery, non-compliant with blood pressure medications presents as transfer from [facility]. Per family has been having headache for last 3 days, increased confusion, forgetfulness that is progressive in nature. Was seen at [place] with 2 cm left subacute SDH with 1.4cm shift. Patient takes ASA at baseline as prophylaxis. At [facility] ED, patient is alert, pleasant, oriented x 2, is not able to recall date with right weakness. Son and daughter at bedside. Patient to receive DDAVP / PLT. Preop for OR for left craniotomy. Repeat CT / CTA here.     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 Son is . (23 Dec 2017 07:50)    12/23 Adm NSCU s/p L crani for subacute SDH evac    Overnight Events: none reported.    ROS: negative [x] unable to obtain as patient is comatose/intubated/aphasic []   VITALS:   T(C): 36.7 (12-24-17 @ 11:00), Max: 37.1 (12-23-17 @ 23:00)  HR: 64 (12-24-17 @ 11:00) (52 - 97)  BP: --  RR: 15 (12-24-17 @ 11:00) (8 - 29)  SpO2: 99% (12-24-17 @ 11:00) (96% - 100%)    12-23-17 @ 07:01  -  12-24-17 @ 07:00  --------------------------------------------------------  IN: 2525 mL / OUT: 1200 mL / NET: 1325 mL    12-24-17 @ 07:01  -  12-24-17 @ 12:26  --------------------------------------------------------  IN: 225 mL / OUT: 0 mL / NET: 225 mL      LABS:                          12.4   12.3  )-----------( 301      ( 23 Dec 2017 21:35 )             35.6     12-23    141  |  105  |  15  ----------------------------<  164<H>  3.7   |  23  |  0.87    Ca    9.0      23 Dec 2017 21:35  Phos  3.3     12-23  Mg     1.8     12-23    TPro  7.9  /  Alb  4.0  /  TBili  0.3  /  DBili  x   /  AST  15  /  ALT  14  /  AlkPhos  55  12-23    PT/INR - ( 23 Dec 2017 07:17 )   PT: 12.8 sec;   INR: 1.17 ratio         PTT - ( 23 Dec 2017 07:17 )  PTT:29.9 sec  MEDS:  MEDICATIONS  (STANDING):  ATENolol  Tablet 25 milliGRAM(s) Oral daily  atorvastatin 10 milliGRAM(s) Oral at bedtime  docusate sodium 100 milliGRAM(s) Oral daily  enoxaparin Injectable 40 milliGRAM(s) SubCutaneous <User Schedule>  influenza   Vaccine 0.5 milliLiter(s) IntraMuscular once  levETIRAcetam 500 milliGRAM(s) Oral every 12 hours  senna 2 Tablet(s) Oral at bedtime      [All pertinent recent Imaging/Reports reviewed]    DEVICES: [] Restraints [] VERONICA/HMV []LD [] ET tube [] Trach [] A-line [] Rodas [] NGT [] Rectal Tube       EXAMINATION:  General:  calm  HEENT:  MMM  Neuro:  awake, alert, oriented to name/hospital/year however not the name of the hospital, follows commands, moves all extremities full strength, no drift  Cards:  RRR  Respiratory:  no respiratory distress  Adomen:  soft  Extremities:  no edema  Skin:  warm/dry

## 2017-12-24 NOTE — PROGRESS NOTE ADULT - ASSESSMENT
Summary: POD#1 L crani for subacute SDH evacuation    NEURO:  q1h neuro checks, CTH in am stable, monitor SD drain output, keppra for seizure ppx x7d, pain control, no sedation  PT/OT/OOB as tolerated    CARDS:  -150--cont thi emeds    PULM:  sat > 92%    RENAL:  IVL     GASTRO:  advance as tolerated  ---> Stress ulcer prophylaxis:  PPI not indicated    HEME:  monitor H/H    ---> DVT prophylaxis: SCDs, start Lovenox tonight    ENDO:  euglycemia    ID:  afebrile    Code status:  Full code  Disposition:  ICU    This patient was at high risk of neurologic deterioration and/or death due to: reaccumulation of SDH, brain compression    Time spent:  45 minutes Summary: POD#1 L crani for subacute SDH evacuation    NEURO:  q1h neuro checks, CTH in am stable, monitor SD drain output, keppra for seizure ppx x7d, pain control, no sedation  PT/OT/OOB as tolerated    CARDS:  -150--cont home meds    PULM:  sat > 92%    RENAL:  IVL     GASTRO:  advance as tolerated  ---> Stress ulcer prophylaxis:  PPI not indicated    HEME:  monitor H/H    ---> DVT prophylaxis: SCDs, start Lovenox tonight    ENDO:  euglycemia    ID:  afebrile    Code status:  Full code  Disposition:  ICU    This patient was at high risk of neurologic deterioration and/or death due to: reaccumulation of SDH, brain compression    Time spent:  30 minutes

## 2017-12-24 NOTE — PROGRESS NOTE ADULT - SUBJECTIVE AND OBJECTIVE BOX
POD 1 from subdural hematoma evacuation    Exam: Awake, alert, fully oriented, no drift, full strength

## 2017-12-24 NOTE — PROGRESS NOTE ADULT - SUBJECTIVE AND OBJECTIVE BOX
Patient no overnight events, exam has improved since operation. Daughter at bedside.    Awake, pleasant, complains of mild headache  Pupils bilaterally reactive, oriented x 3  No drift, moving all extremities with good strength      57F s/p left craniotomy for subdural hematoma post-operative day 1  Obtain routine CTH this am, subdural VERONICA put out 200 will likely leave in at least 3 days, continue with keppra  CTA read as negative, mobilize patient will need PT / PMR / OT. Continue with Q1 neurochecks. Consider DVT ppx is CTH stable.

## 2017-12-25 LAB
CK MB BLD-MCNC: 0.5 % — SIGNIFICANT CHANGE UP (ref 0–3.5)
CK MB CFR SERPL CALC: 1 NG/ML — SIGNIFICANT CHANGE UP (ref 0–3.8)
CK SERPL-CCNC: 182 U/L — HIGH (ref 25–170)
GLUCOSE BLDC GLUCOMTR-MCNC: 129 MG/DL — HIGH (ref 70–99)
GLUCOSE BLDC GLUCOMTR-MCNC: 144 MG/DL — HIGH (ref 70–99)
GLUCOSE BLDC GLUCOMTR-MCNC: 161 MG/DL — HIGH (ref 70–99)
GLUCOSE BLDC GLUCOMTR-MCNC: 179 MG/DL — HIGH (ref 70–99)
TROPONIN T SERPL-MCNC: <0.01 NG/ML — SIGNIFICANT CHANGE UP (ref 0–0.06)

## 2017-12-25 PROCEDURE — 70450 CT HEAD/BRAIN W/O DYE: CPT | Mod: 26

## 2017-12-25 PROCEDURE — 99233 SBSQ HOSP IP/OBS HIGH 50: CPT

## 2017-12-25 RX ADMIN — OXYCODONE HYDROCHLORIDE 5 MILLIGRAM(S): 5 TABLET ORAL at 22:13

## 2017-12-25 RX ADMIN — OXYCODONE HYDROCHLORIDE 10 MILLIGRAM(S): 5 TABLET ORAL at 16:49

## 2017-12-25 RX ADMIN — OXYCODONE HYDROCHLORIDE 10 MILLIGRAM(S): 5 TABLET ORAL at 17:20

## 2017-12-25 RX ADMIN — Medication 1: at 18:23

## 2017-12-25 RX ADMIN — ATORVASTATIN CALCIUM 10 MILLIGRAM(S): 80 TABLET, FILM COATED ORAL at 21:43

## 2017-12-25 RX ADMIN — Medication 100 MILLIGRAM(S): at 13:22

## 2017-12-25 RX ADMIN — LEVETIRACETAM 500 MILLIGRAM(S): 250 TABLET, FILM COATED ORAL at 05:31

## 2017-12-25 RX ADMIN — Medication 1: at 13:22

## 2017-12-25 RX ADMIN — LEVETIRACETAM 500 MILLIGRAM(S): 250 TABLET, FILM COATED ORAL at 17:38

## 2017-12-25 RX ADMIN — OXYCODONE HYDROCHLORIDE 10 MILLIGRAM(S): 5 TABLET ORAL at 08:55

## 2017-12-25 RX ADMIN — OXYCODONE HYDROCHLORIDE 5 MILLIGRAM(S): 5 TABLET ORAL at 21:43

## 2017-12-25 RX ADMIN — ENOXAPARIN SODIUM 40 MILLIGRAM(S): 100 INJECTION SUBCUTANEOUS at 17:39

## 2017-12-25 RX ADMIN — SENNA PLUS 2 TABLET(S): 8.6 TABLET ORAL at 21:44

## 2017-12-25 RX ADMIN — OXYCODONE HYDROCHLORIDE 10 MILLIGRAM(S): 5 TABLET ORAL at 08:25

## 2017-12-25 NOTE — OCCUPATIONAL THERAPY INITIAL EVALUATION ADULT - GENERAL OBSERVATIONS, REHAB EVAL
Pt found semi-supine in bed, +L radial A line, +cardiac monitor, +continuous O2 monitor, +b/l venodynes.

## 2017-12-25 NOTE — PROGRESS NOTE ADULT - SUBJECTIVE AND OBJECTIVE BOX
Patient Seen and Examined.     Overnight Events:   None    T(C): 36.8 (12-25-17 @ 03:00), Max: 36.8 (12-24-17 @ 07:00)  HR: 58 (12-25-17 @ 03:00) (50 - 73)  BP: --  RR: 15 (12-25-17 @ 03:00) (8 - 21)  SpO2: 100% (12-25-17 @ 03:00) (96% - 100%)    Exam:   Awake, Alert, AOX2  PERRL, EOMI, Face equal, Tongue m/l  5/5 throughout, no drift  SILT    ATENolol  Tablet 25 milliGRAM(s) Oral daily  atorvastatin 10 milliGRAM(s) Oral at bedtime  docusate sodium 100 milliGRAM(s) Oral daily  enoxaparin Injectable 40 milliGRAM(s) SubCutaneous <User Schedule>  influenza   Vaccine 0.5 milliLiter(s) IntraMuscular once  insulin lispro (HumaLOG) corrective regimen sliding scale   SubCutaneous Before meals and at bedtime  levETIRAcetam 500 milliGRAM(s) Oral every 12 hours  oxyCODONE    IR 5 milliGRAM(s) Oral every 4 hours PRN  oxyCODONE    IR 10 milliGRAM(s) Oral every 4 hours PRN  senna 2 Tablet(s) Oral at bedtime                            11.6   10.3  )-----------( 274      ( 24 Dec 2017 22:40 )             34.1     12-24    141  |  106  |  13  ----------------------------<  150<H>  3.8   |  23  |  0.83    Ca    8.6      24 Dec 2017 22:40  Phos  3.0     12-24  Mg     2.0     12-24    TPro  7.9  /  Alb  4.0  /  TBili  0.3  /  DBili  x   /  AST  15  /  ALT  14  /  AlkPhos  55  12-23    PT/INR - ( 23 Dec 2017 07:17 )   PT: 12.8 sec;   INR: 1.17 ratio         PTT - ( 23 Dec 2017 07:17 )  PTT:29.9 sec    Imaging:   < from: CT Head No Cont (12.24.17 @ 08:53) >  IMPRESSION: Interval left frontal parietal craniotomy and placement of   left-sided subdural drain.    Decreased size and density of left holohemispheric subdural hematoma.   Decreased rightward midline shift.    < end of copied text >

## 2017-12-25 NOTE — PHYSICAL THERAPY INITIAL EVALUATION ADULT - PRECAUTIONS/LIMITATIONS, REHAB EVAL
CTH 12/23 Acute on subacute left convexity subdural hematoma produces rightward shift/subfalcine herniation and uncal herniation. Asymmetric flaring of the right temporal horn and atrium due to early entrapment. 12/24 Interval left frontal parietal craniotomy and placement of left-sided subdural drain. Decreased size and density of left holohemispheric subdural hematoma. Decreased rightward midline shift. Decreased right lateral ventricle dilatation, with residual mild dilatation of the right ventricular atrium and occipital horn. Basal cisterns and sulci are more well visualized. CTA head 12/23 Patent intracranial circulation without flow-limiting stenosis or occlusion.

## 2017-12-25 NOTE — OCCUPATIONAL THERAPY INITIAL EVALUATION ADULT - ANTICIPATED DISCHARGE DISPOSITION, OT EVAL
to increase independence with ADLs, IADLs, functional mobility and assess safety in the home environment. Recommend supervision/assist with functional activities./home w/ OT

## 2017-12-25 NOTE — OCCUPATIONAL THERAPY INITIAL EVALUATION ADULT - PERTINENT HX OF CURRENT PROBLEM, REHAB EVAL
56 yo F hx of HTN, DM, HLD , bariatric surgery, non-compliant w/ BP medications presents as transfer from [facility]. Per family has been having headache for last 3 days, increased confusion, forgetfulness that is progressive. Was seen at [place] with 2 cm L subacute SDH w/ 1.4cm shift. Pt takes ASA at baseline as prophylaxis. At [facility] ED, pt is alert, pleasant, oriented x 2, unable to recall date with R weakness. Preop for OR for left craniotomy.

## 2017-12-25 NOTE — PHYSICAL THERAPY INITIAL EVALUATION ADULT - PERTINENT HX OF CURRENT PROBLEM, REHAB EVAL
57yoF, pmhx HTN, DM, HLD, bariatric surgery, non-compliant with BP medications presents as transfer from [facility]. Per family has been having HA for last 3 days, increased confusion, forgetfulness that is progressive in nature. Was seen at [place] with left subacute SDH with shift. pt takes ASA at baseline as prophylaxis. At [facility] ED, pt is alert, pleasant, oriented x 2, is not able to recall date with right weakness.

## 2017-12-25 NOTE — OCCUPATIONAL THERAPY INITIAL EVALUATION ADULT - ADDITIONAL COMMENTS
Pt s/p L craniotomy for evacuation of SDH 12/23/17.  CT Head 12/24/17: Interval left frontal parietal craniotomy and placement of left-sided subdural drain. Decreased size and density of left holohemispheric subdural hematoma. Decreased rightward midline shift. Decreased right lateral ventricle dilatation, with residual mild dilatation of the right ventricular atrium and occipital horn. Basal cisterns and sulci are more well visualized.  CTA Head 12/23/17: Patent intracranial circulation without flow-limiting stenosis or occlusion. Moderate stenosis of the left cervical internal carotid artery, partially imaged. Retropharyngeal course of the bilateral cervical internal carotid arteries.  CT Head 12/23/17: Acute on subacute left convexity subdural hematoma produces 17 mm rightward shift/subfalcine herniation and uncal herniation.

## 2017-12-25 NOTE — OCCUPATIONAL THERAPY INITIAL EVALUATION ADULT - DIAGNOSIS, OT EVAL
Pt with impaired strength, balance impacting pt's ability to complete ADLs, IADLs, functional mobility.

## 2017-12-25 NOTE — PROGRESS NOTE ADULT - ASSESSMENT
Summary: POD#2 L crani for subacute SDH evacuation    NEURO:  q1h neuro checks, CTH in am stable, monitor SD drain output, keppra for seizure ppx x7d, pain control, no sedation  PT/OT/OOB as tolerated    CARDS:  -150--cont home meds    PULM:  sat > 92%    RENAL:  IVL     GASTRO:  advance as tolerated  ---> Stress ulcer prophylaxis:  PPI not indicated    HEME:  monitor H/H    ---> DVT prophylaxis: SCDs,on Lovenox     ENDO:  euglycemia    ID:  afebrile    Code status:  Full code  Disposition:  ICU    This patient was at high risk of neurologic deterioration and/or death due to: reaccumulation of SDH, brain compression    Time spent:  30 minutes Summary: POD#2 L crani for subacute SDH evacuation    NEURO:  q4h neuro checks, CTH in am stable post VERONICA pull, keppra for seizure ppx x7d, pain control, no sedation  PT/OT/OOB as tolerated    CARDS:  -150--cont home meds    PULM:  sat > 92%    RENAL:  IVL     GASTRO:  advance as tolerated  ---> Stress ulcer prophylaxis:  PPI not indicated    HEME:  monitor H/H    ---> DVT prophylaxis: SCDs,on Lovenox     ENDO:  euglycemia    ID:  afebrile    Code status:  Full code  Disposition:  floor transfer    This patient was at high risk of neurologic deterioration and/or death due to: reaccumulation of SDH, brain compression    Time spent:  30 minutes Summary: POD#2 L crani for subacute SDH evacuation    NEURO:  q4h neuro checks, CTH in am stable post VERONICA pull, keppra for seizure ppx x7d, pain control, no sedation  PT/OT/OOB as tolerated    CARDS:  -150--cont home meds    PULM:  sat > 92%    RENAL:  IVL     GASTRO:  advance as tolerated  ---> Stress ulcer prophylaxis:  PPI not indicated    HEME:  monitor H/H    ---> DVT prophylaxis: SCDs,on Lovenox     ENDO:  euglycemia    ID:  afebrile    Code status:  Full code  Disposition:  floor transfer    Patient was not critically ill, but was medically complex.     Time spent:  30 minutes

## 2017-12-25 NOTE — PROGRESS NOTE ADULT - SUBJECTIVE AND OBJECTIVE BOX
HPI:  57 year old female with history of HTN, DM, HLD , bariatric surgery, non-compliant with blood pressure medications presents as transfer from [facility]. Per family has been having headache for last 3 days, increased confusion, forgetfulness that is progressive in nature. Was seen at [place] with 2 cm left subacute SDH with 1.4cm shift. Patient takes ASA at baseline as prophylaxis. At [facility] ED, patient is alert, pleasant, oriented x 2, is not able to recall date with right weakness. Son and daughter at bedside. Patient to receive DDAVP / PLT. Preop for OR for left craniotomy. Repeat CT / CTA here.     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 Son is . (23 Dec 2017 07:50)    12/23 Adm [facility]SCU s/p L crani for subacute SDH evac    Overnight Events: episode of Bradycardia, trops and EKG with no acute changes.    ROS: negative [x] unable to obtain as patient is comatose/intubated/aphasic []   VITALS:   T(C): 36.8 (12-25-17 @ 03:00), Max: 36.8 (12-24-17 @ 15:00)  HR: 58 (12-25-17 @ 03:00) (50 - 73)  BP: --  RR: 15 (12-25-17 @ 03:00) (10 - 21)  SpO2: 100% (12-25-17 @ 03:00) (96% - 100%)    12-24-17 @ 07:01  -  12-25-17 @ 07:00  --------------------------------------------------------  IN: 945 mL / OUT: 1130 mL / NET: -185 mL      LABS:                          11.6   10.3  )-----------( 274      ( 24 Dec 2017 22:40 )             34.1     12-24    141  |  106  |  13  ----------------------------<  150<H>  3.8   |  23  |  0.83    Ca    8.6      24 Dec 2017 22:40  Phos  3.0     12-24  Mg     2.0     12-24        MEDS:  MEDICATIONS  (STANDING):  ATENolol  Tablet 25 milliGRAM(s) Oral daily  atorvastatin 10 milliGRAM(s) Oral at bedtime  docusate sodium 100 milliGRAM(s) Oral daily  enoxaparin Injectable 40 milliGRAM(s) SubCutaneous <User Schedule>  influenza   Vaccine 0.5 milliLiter(s) IntraMuscular once  insulin lispro (HumaLOG) corrective regimen sliding scale   SubCutaneous Before meals and at bedtime  levETIRAcetam 500 milliGRAM(s) Oral every 12 hours  senna 2 Tablet(s) Oral at bedtime      [All pertinent recent Imaging/Reports reviewed]    DEVICES: [] Restraints [] VERONICA/HMV []LD [] ET tube [] Trach [] A-line [] Rodas [] NGT [] Rectal Tube       EXAMINATION:  General:  calm  HEENT:  MMM  Neuro:  awake, alert, oriented to name/hospital/year however not the name of the hospital, follows commands, moves all extremities full strength, no drift  Cards:  RRR  Respiratory:  no respiratory distress  Adomen:  soft  Extremities:  no edema  Skin:  warm/dry

## 2017-12-25 NOTE — PROGRESS NOTE ADULT - ASSESSMENT
56F POD2 from L craniotomy for SDH.   -f/u CTA with Dr. Zheng  - q. 1 hour neuro checks   - Continue to monitor drain output   -PT/OT/PMR OOB   - Pain control

## 2017-12-25 NOTE — PHYSICAL THERAPY INITIAL EVALUATION ADULT - LIVES WITH, PROFILE
as per pt, pt lives alone in an apt, no steps to enter, +elevator. PTA, pt independent with all ADLs and functional activities with occasional use of cane due to back pain

## 2017-12-25 NOTE — OCCUPATIONAL THERAPY INITIAL EVALUATION ADULT - PLANNED THERAPY INTERVENTIONS, OT EVAL
ADL retraining/IADL retraining/balance training/cognitive, visual perceptual/strengthening/transfer training

## 2017-12-25 NOTE — PHYSICAL THERAPY INITIAL EVALUATION ADULT - GAIT DEVIATIONS NOTED, PT EVAL
decreased mushtaq/decreased velocity of limb motion/decreased step length/decreased stride length/decreased weight-shifting ability

## 2017-12-25 NOTE — OCCUPATIONAL THERAPY INITIAL EVALUATION ADULT - LIVES WITH, PROFILE
Pt reports living in an apartment with 0 steps to negotiate with her dog, tub shower with chair. Pt was independent with ADLs, IADLs, functional mobility (occasionally used straight cane), does not drive, prior to admission. Pt reports her children live nearby and can assist as needed./alone

## 2017-12-26 ENCOUNTER — TRANSCRIPTION ENCOUNTER (OUTPATIENT)
Age: 57
End: 2017-12-26

## 2017-12-26 VITALS
OXYGEN SATURATION: 99 % | TEMPERATURE: 99 F | SYSTOLIC BLOOD PRESSURE: 117 MMHG | DIASTOLIC BLOOD PRESSURE: 60 MMHG | RESPIRATION RATE: 18 BRPM | HEART RATE: 66 BPM

## 2017-12-26 LAB
GLUCOSE BLDC GLUCOMTR-MCNC: 139 MG/DL — HIGH (ref 70–99)
GLUCOSE BLDC GLUCOMTR-MCNC: 148 MG/DL — HIGH (ref 70–99)
GLUCOSE BLDC GLUCOMTR-MCNC: 198 MG/DL — HIGH (ref 70–99)

## 2017-12-26 PROCEDURE — 70496 CT ANGIOGRAPHY HEAD: CPT

## 2017-12-26 PROCEDURE — 80053 COMPREHEN METABOLIC PANEL: CPT

## 2017-12-26 PROCEDURE — 84702 CHORIONIC GONADOTROPIN TEST: CPT

## 2017-12-26 PROCEDURE — 97116 GAIT TRAINING THERAPY: CPT

## 2017-12-26 PROCEDURE — 84484 ASSAY OF TROPONIN QUANT: CPT

## 2017-12-26 PROCEDURE — 97163 PT EVAL HIGH COMPLEX 45 MIN: CPT

## 2017-12-26 PROCEDURE — 97165 OT EVAL LOW COMPLEX 30 MIN: CPT

## 2017-12-26 PROCEDURE — 99285 EMERGENCY DEPT VISIT HI MDM: CPT | Mod: 25

## 2017-12-26 PROCEDURE — G0515: CPT

## 2017-12-26 PROCEDURE — 97530 THERAPEUTIC ACTIVITIES: CPT

## 2017-12-26 PROCEDURE — 85730 THROMBOPLASTIN TIME PARTIAL: CPT

## 2017-12-26 PROCEDURE — 82550 ASSAY OF CK (CPK): CPT

## 2017-12-26 PROCEDURE — 86850 RBC ANTIBODY SCREEN: CPT

## 2017-12-26 PROCEDURE — 82962 GLUCOSE BLOOD TEST: CPT

## 2017-12-26 PROCEDURE — 36430 TRANSFUSION BLD/BLD COMPNT: CPT

## 2017-12-26 PROCEDURE — 83735 ASSAY OF MAGNESIUM: CPT

## 2017-12-26 PROCEDURE — 80048 BASIC METABOLIC PNL TOTAL CA: CPT

## 2017-12-26 PROCEDURE — 86901 BLOOD TYPING SEROLOGIC RH(D): CPT

## 2017-12-26 PROCEDURE — C1889: CPT

## 2017-12-26 PROCEDURE — 70450 CT HEAD/BRAIN W/O DYE: CPT

## 2017-12-26 PROCEDURE — P9037: CPT

## 2017-12-26 PROCEDURE — 84100 ASSAY OF PHOSPHORUS: CPT

## 2017-12-26 PROCEDURE — 96374 THER/PROPH/DIAG INJ IV PUSH: CPT | Mod: XU

## 2017-12-26 PROCEDURE — 93005 ELECTROCARDIOGRAM TRACING: CPT

## 2017-12-26 PROCEDURE — 82553 CREATINE MB FRACTION: CPT

## 2017-12-26 PROCEDURE — C1769: CPT

## 2017-12-26 PROCEDURE — 85027 COMPLETE CBC AUTOMATED: CPT

## 2017-12-26 PROCEDURE — 86900 BLOOD TYPING SEROLOGIC ABO: CPT

## 2017-12-26 PROCEDURE — C1713: CPT

## 2017-12-26 PROCEDURE — 85610 PROTHROMBIN TIME: CPT

## 2017-12-26 RX ORDER — OXYCODONE HYDROCHLORIDE 5 MG/1
1 TABLET ORAL
Qty: 20 | Refills: 0
Start: 2017-12-26

## 2017-12-26 RX ORDER — DOCUSATE SODIUM 100 MG
1 CAPSULE ORAL
Qty: 30 | Refills: 0
Start: 2017-12-26

## 2017-12-26 RX ORDER — SENNA PLUS 8.6 MG/1
2 TABLET ORAL
Qty: 30 | Refills: 0
Start: 2017-12-26

## 2017-12-26 RX ORDER — POLYETHYLENE GLYCOL 3350 17 G/17G
17 POWDER, FOR SOLUTION ORAL ONCE
Refills: 0 | Status: COMPLETED | OUTPATIENT
Start: 2017-12-26 | End: 2017-12-26

## 2017-12-26 RX ORDER — LEVETIRACETAM 250 MG/1
1 TABLET, FILM COATED ORAL
Qty: 10 | Refills: 0
Start: 2017-12-26

## 2017-12-26 RX ADMIN — LEVETIRACETAM 500 MILLIGRAM(S): 250 TABLET, FILM COATED ORAL at 17:16

## 2017-12-26 RX ADMIN — Medication 100 MILLIGRAM(S): at 12:36

## 2017-12-26 RX ADMIN — Medication 1: at 12:42

## 2017-12-26 RX ADMIN — OXYCODONE HYDROCHLORIDE 10 MILLIGRAM(S): 5 TABLET ORAL at 17:14

## 2017-12-26 RX ADMIN — LEVETIRACETAM 500 MILLIGRAM(S): 250 TABLET, FILM COATED ORAL at 06:21

## 2017-12-26 RX ADMIN — ATENOLOL 25 MILLIGRAM(S): 25 TABLET ORAL at 06:21

## 2017-12-26 RX ADMIN — POLYETHYLENE GLYCOL 3350 17 GRAM(S): 17 POWDER, FOR SOLUTION ORAL at 13:37

## 2017-12-26 RX ADMIN — ENOXAPARIN SODIUM 40 MILLIGRAM(S): 100 INJECTION SUBCUTANEOUS at 17:16

## 2017-12-26 RX ADMIN — OXYCODONE HYDROCHLORIDE 5 MILLIGRAM(S): 5 TABLET ORAL at 06:24

## 2017-12-26 RX ADMIN — OXYCODONE HYDROCHLORIDE 10 MILLIGRAM(S): 5 TABLET ORAL at 17:45

## 2017-12-26 NOTE — DISCHARGE NOTE ADULT - NS AS ACTIVITY OBS
No Heavy lifting/straining/Do not make important decisions/Do not drive or operate machinery/Walking-Outdoors allowed

## 2017-12-26 NOTE — DISCHARGE NOTE ADULT - PLAN OF CARE
12/23 s/p lt crani for evacuation of chronic sdh Please follow up Neurosurgeon in one week. Please call office to make appointment. Please follow up with Primary Care Physician. Please call office to make appointment. Please make an appointment for follow up with your primary care physician after discharge.

## 2017-12-26 NOTE — DISCHARGE NOTE ADULT - MEDICATION SUMMARY - MEDICATIONS TO TAKE
I will START or STAY ON the medications listed below when I get home from the hospital:    Rolling walker   -- atraumatic sdh   -- Indication: For SDH (subdural hematoma)    oxyCODONE 5 mg oral tablet  -- 1 tab(s) by mouth every 4 hours, As needed, Moderate Pain (4 - 6) MDD:6  -- Indication: For moderate pain     oxyCODONE 10 mg oral tablet  -- 1 tab(s) by mouth every 4 hours, As needed, Severe Pain (7 - 10) MDD:6  -- Indication: For Severe pain     levETIRAcetam 500 mg oral tablet  -- 1 tab(s) by mouth every 12 hours MDD:2  -- Indication: For Seizure ppx     glipiZIDE 2.5 mg oral tablet, extended release  -- 1 tab(s) by mouth once a day  -- Indication: For Dm     pioglitazone 15 mg oral tablet  -- 1 tab(s) by mouth once a day  -- Indication: For Dm     Lipitor 10 mg oral tablet  -- 1 tab(s) by mouth once a day  -- Indication: For hyperlipidemia     atenolol 25 mg oral tablet  -- 1 tab(s) by mouth once a day  -- Indication: For htn     hydroCHLOROthiazide 12.5 mg oral capsule  -- 1 cap(s) by mouth once a day  -- Indication: For Diuretic     senna oral tablet  -- 2 tab(s) by mouth once a day (at bedtime) MDD:2  -- Indication: For constipation     docusate sodium 100 mg oral capsule  -- 1 cap(s) by mouth once a day MDD:1  -- Indication: For constipation

## 2017-12-26 NOTE — DISCHARGE NOTE ADULT - PATIENT PORTAL LINK FT
“You can access the Follow[employer] Patient Portal, offered by [facility], by registering with the following website: [email or web address]”

## 2017-12-26 NOTE — PROGRESS NOTE ADULT - SUBJECTIVE AND OBJECTIVE BOX
SUBJECTIVE: Pt seen and examined at bedside and offers no complaints at this time.     OVERNIGHT EVENTS: none     Vital Signs Last 24 Hrs  T(C): 36.6 (26 Dec 2017 10:36), Max: 37 (25 Dec 2017 18:00)  T(F): 97.9 (26 Dec 2017 10:36), Max: 98.6 (25 Dec 2017 18:00)  HR: 54 (26 Dec 2017 11:01) (54 - 67)  BP: 145/73 (26 Dec 2017 11:01) (97/63 - 159/76)  BP(mean): --  RR: 18 (26 Dec 2017 10:36) (15 - 18)  SpO2: 100% (26 Dec 2017 11:01) (95% - 100%)    PHYSICAL EXAM:    General: No Acute Distress     Neurological: Awake, alert oriented to person, place and time, Following Commands, PERRL, EOMI, Face Symmetrical, Speech Fluent, Moving all extremities, Muscle Strength normal in all four extremities, No Drift, Sensation to Light Touch Intact    Pulmonary: Clear to Auscultation, No Rales, No Rhonchi, No Wheezes     Cardiovascular: S1, S2, Regular Rate and Rhythm     Gastrointestinal: Soft, Nontender, Nondistended     Extremities: No calf tenderness     Incision:     LABS:                        11.6   10.3  )-----------( 274      ( 24 Dec 2017 22:40 )             34.1    12-24    141  |  106  |  13  ----------------------------<  150<H>  3.8   |  23  |  0.83    Ca    8.6      24 Dec 2017 22:40  Phos  3.0     12-24  Mg     2.0     12-24 12-25 @ 07:01  -  12-26 @ 07:00  --------------------------------------------------------  IN: 100 mL / OUT: 700 mL / NET: -600 mL      DRAINS:     MEDICATIONS:  Antibiotics:    Neuro:  levETIRAcetam 500 milliGRAM(s) Oral every 12 hours  oxyCODONE    IR 5 milliGRAM(s) Oral every 4 hours PRN Moderate Pain (4 - 6)  oxyCODONE    IR 10 milliGRAM(s) Oral every 4 hours PRN Severe Pain (7 - 10)    Cardiac:  ATENolol  Tablet 25 milliGRAM(s) Oral daily    Pulm:    GI/:  docusate sodium 100 milliGRAM(s) Oral daily  senna 2 Tablet(s) Oral at bedtime    Other:   atorvastatin 10 milliGRAM(s) Oral at bedtime  enoxaparin Injectable 40 milliGRAM(s) SubCutaneous <User Schedule>  influenza   Vaccine 0.5 milliLiter(s) IntraMuscular once  insulin lispro (HumaLOG) corrective regimen sliding scale   SubCutaneous Before meals and at bedtime    DIET: ccd     IMAGING: < from: CT Head No Cont (12.25.17 @ 12:21) >  Status post left subdural drain removal. Left frontal   parietal extra-axial fluid with minimal hemorrhage remaining    < end of copied text >

## 2017-12-26 NOTE — DISCHARGE NOTE ADULT - HOSPITAL COURSE
57 year old female with history of HTN, DM, HLD , bariatric surgery, non-compliant with blood pressure medications presents as transfer from [facility]. Per family has been having headache for last 3 days, increased confusion, forgetfulness that is progressive in nature. Was seen at [place] with 2 cm left subacute SDH with 1.4cm shift. Patient takes ASA at baseline as prophylaxis. At [facility] ED, patient is alert, pleasant, oriented x 2, is not able to recall date with right weakness. Son and daughter at bedside. Patient to receive DDAVP / PLT. Preop for OR for left craniotomy. Repeat CT / CTA here.  She is  s/p lt crani for evacuation of chronic sdh on 12/23. Her post op course was uncomplicated and PT/OT saw her and recommended home with home PT and rolling walker. At the time of discharge pt is neurologically and hemodynamically stable and clear for discharge.

## 2017-12-26 NOTE — DISCHARGE NOTE ADULT - CARE PROVIDER_API CALL
Larry Saenz), Neurological Surgery  [address] [place] [address]  Phone: [phone]  Fax: [phone]

## 2017-12-26 NOTE — PROGRESS NOTE ADULT - ASSESSMENT
HPI:  57 year old female with history of HTN, DM, HLD , bariatric surgery, non-compliant with blood pressure medications presents as transfer from [facility]. Per family has been having headache for last 3 days, increased confusion, forgetfulness that is progressive in nature. Was seen at [place] with 2 cm left subacute SDH with 1.4cm shift. Patient takes ASA at baseline as prophylaxis. At [facility] ED, patient is alert, pleasant, oriented x 2, is not able to recall date with right weakness. Son and daughter at bedside. Patient to receive DDAVP / PLT. Preop for OR for left craniotomy. Repeat CT / CTA here.     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 Son is . (23 Dec 2017 07:50)    PROCEDURE:  12/23 Left crani for evacuation of chronic sdh   POD#3    PLAN:  -Neuro stable. Oxy IR for pain control  -Encourage incentive spirometry  -Colace, senna for bowel regimen  -Venodynes and sql for dvt ppx   -PT/OT: home with home PT. Pt will be going home later today.       Will discuss above with Dr. Saenz  [facility] #[id number]

## 2017-12-26 NOTE — DISCHARGE NOTE ADULT - CARE PLAN
Principal Discharge DX:	SDH (subdural hematoma)  Goal:	12/23 s/p lt crani for evacuation of chronic sdh  Instructions for follow-up, activity and diet:	Please follow up Neurosurgeon in one week. Please call office to make appointment. Please follow up with Primary Care Physician. Please call office to make appointment.  Secondary Diagnosis:	Essential hypertension  Instructions for follow-up, activity and diet:	Please make an appointment for follow up with your primary care physician after discharge.  Secondary Diagnosis:	Pure hypercholesterolemia  Instructions for follow-up, activity and diet:	Please make an appointment for follow up with your primary care physician after discharge.  Secondary Diagnosis:	Diabetes mellitus of other type without complication, unspecified long term insulin use status

## 2017-12-26 NOTE — DISCHARGE NOTE ADULT - ADDITIONAL INSTRUCTIONS
Staples will be removed at follow up appointment. Keep incision site clean and dry. OK to shower but do not scrub incision area and pat dry when done.   please keep incision clean and dry, do not submerge wound in water for prolonged periods of time, pat dry after showering, and do not use any creams or ointments to incision.

## 2017-12-26 NOTE — DISCHARGE NOTE ADULT - SECONDARY DIAGNOSIS.
Essential hypertension Diabetes mellitus of other type without complication, unspecified long term insulin use status Pure hypercholesterolemia

## 2017-12-26 NOTE — DISCHARGE NOTE ADULT - INSTRUCTIONS
ccd diet.   NO heavy lifting, strenous activity, twisting, bending, driving, or working until cleared by your physician.  Please return to the emergency department if you develop changes in mental status, seizures, fainting, dizziness, changes in vision, lethargy, nausea, vomiting, chest pain, shortness of breathe or severe pain. per MD

## 2017-12-28 PROBLEM — Z00.00 ENCOUNTER FOR PREVENTIVE HEALTH EXAMINATION: Status: ACTIVE | Noted: 2017-12-28

## 2018-01-03 ENCOUNTER — APPOINTMENT (OUTPATIENT)
Dept: NEUROSURGERY | Facility: CLINIC | Age: 58
End: 2018-01-03
Payer: MEDICARE

## 2018-01-03 VITALS
DIASTOLIC BLOOD PRESSURE: 68 MMHG | HEIGHT: 69 IN | OXYGEN SATURATION: 96 % | BODY MASS INDEX: 36.73 KG/M2 | SYSTOLIC BLOOD PRESSURE: 105 MMHG | WEIGHT: 248 LBS | HEART RATE: 50 BPM | TEMPERATURE: 98.14 F

## 2018-01-03 DIAGNOSIS — E11.9 TYPE 2 DIABETES MELLITUS W/OUT COMPLICATIONS: ICD-10-CM

## 2018-01-03 DIAGNOSIS — I10 ESSENTIAL (PRIMARY) HYPERTENSION: ICD-10-CM

## 2018-01-03 PROCEDURE — 99024 POSTOP FOLLOW-UP VISIT: CPT

## 2018-02-14 ENCOUNTER — APPOINTMENT (OUTPATIENT)
Dept: NEUROSURGERY | Facility: CLINIC | Age: 58
End: 2018-02-14
Payer: MEDICARE

## 2018-02-14 VITALS
HEART RATE: 57 BPM | SYSTOLIC BLOOD PRESSURE: 148 MMHG | DIASTOLIC BLOOD PRESSURE: 88 MMHG | RESPIRATION RATE: 18 BRPM | OXYGEN SATURATION: 99 %

## 2018-02-14 PROCEDURE — 99024 POSTOP FOLLOW-UP VISIT: CPT

## 2018-02-14 RX ORDER — FAMOTIDINE 20 MG/1
20 TABLET, FILM COATED ORAL
Refills: 0 | Status: ACTIVE | COMMUNITY
Start: 2018-02-14

## 2018-02-14 RX ORDER — MELOXICAM 7.5 MG/1
7.5 TABLET ORAL
Qty: 30 | Refills: 0 | Status: DISCONTINUED | COMMUNITY
Start: 2017-10-24

## 2018-02-14 RX ORDER — CYCLOBENZAPRINE HYDROCHLORIDE 10 MG/1
10 TABLET, FILM COATED ORAL
Qty: 30 | Refills: 0 | Status: DISCONTINUED | COMMUNITY
Start: 2017-10-24

## 2018-02-14 RX ORDER — ATORVASTATIN CALCIUM 10 MG/1
10 TABLET, FILM COATED ORAL
Qty: 30 | Refills: 0 | Status: DISCONTINUED | COMMUNITY
Start: 2017-10-15

## 2018-02-14 RX ORDER — METFORMIN HYDROCHLORIDE 500 MG/1
500 TABLET, COATED ORAL
Qty: 60 | Refills: 0 | Status: DISCONTINUED | COMMUNITY
Start: 2017-09-02

## 2018-02-14 RX ORDER — ALCOHOL ANTISEPTIC PADS
PADS, MEDICATED (EA) TOPICAL
Qty: 100 | Refills: 0 | Status: ACTIVE | COMMUNITY
Start: 2018-02-01

## 2018-02-14 RX ORDER — GLIPIZIDE 10 MG/1
10 TABLET ORAL
Qty: 60 | Refills: 0 | Status: DISCONTINUED | COMMUNITY
Start: 2017-08-14

## 2018-02-14 RX ORDER — PIOGLITAZONE HYDROCHLORIDE 15 MG/1
15 TABLET ORAL
Qty: 30 | Refills: 0 | Status: DISCONTINUED | COMMUNITY
Start: 2017-08-14

## 2018-02-14 RX ORDER — HYDROCHLOROTHIAZIDE 25 MG/1
25 TABLET ORAL
Qty: 30 | Refills: 0 | Status: ACTIVE | COMMUNITY
Start: 2017-08-29

## 2018-02-14 RX ORDER — OXYCODONE 5 MG/1
5 TABLET ORAL
Qty: 20 | Refills: 0 | Status: DISCONTINUED | COMMUNITY
Start: 2017-12-26

## 2018-02-14 RX ORDER — ATENOLOL 25 MG/1
25 TABLET ORAL
Qty: 30 | Refills: 0 | Status: ACTIVE | COMMUNITY
Start: 2017-08-14

## 2018-02-14 RX ORDER — BLOOD SUGAR DIAGNOSTIC
STRIP MISCELLANEOUS
Qty: 100 | Refills: 0 | Status: DISCONTINUED | COMMUNITY
Start: 2018-02-01

## 2018-02-14 RX ORDER — OXYCODONE 10 MG/1
10 TABLET ORAL
Qty: 20 | Refills: 0 | Status: DISCONTINUED | COMMUNITY
Start: 2017-12-26

## 2018-07-05 ENCOUNTER — LABORATORY RESULT (OUTPATIENT)
Age: 58
End: 2018-07-05

## 2018-07-05 ENCOUNTER — APPOINTMENT (OUTPATIENT)
Dept: NEUROSURGERY | Facility: CLINIC | Age: 58
End: 2018-07-05
Payer: MEDICARE

## 2018-07-05 PROCEDURE — 99214 OFFICE O/P EST MOD 30 MIN: CPT

## 2018-07-06 LAB — CRP SERPL-MCNC: 0.2 MG/DL

## 2018-07-31 ENCOUNTER — FORM ENCOUNTER (OUTPATIENT)
Age: 58
End: 2018-07-31

## 2018-08-01 ENCOUNTER — OUTPATIENT (OUTPATIENT)
Dept: OUTPATIENT SERVICES | Facility: HOSPITAL | Age: 58
LOS: 1 days | End: 2018-08-01
Payer: COMMERCIAL

## 2018-08-01 ENCOUNTER — APPOINTMENT (OUTPATIENT)
Dept: NEUROSURGERY | Facility: CLINIC | Age: 58
End: 2018-08-01
Payer: MEDICARE

## 2018-08-01 ENCOUNTER — APPOINTMENT (OUTPATIENT)
Dept: CT IMAGING | Facility: IMAGING CENTER | Age: 58
End: 2018-08-01

## 2018-08-01 DIAGNOSIS — I62.03 NONTRAUMATIC CHRONIC SUBDURAL HEMORRHAGE: ICD-10-CM

## 2018-08-01 PROCEDURE — 70450 CT HEAD/BRAIN W/O DYE: CPT | Mod: 26

## 2018-08-01 PROCEDURE — 70450 CT HEAD/BRAIN W/O DYE: CPT

## 2018-08-01 PROCEDURE — 99214 OFFICE O/P EST MOD 30 MIN: CPT

## 2019-05-01 ENCOUNTER — OUTPATIENT (OUTPATIENT)
Dept: OUTPATIENT SERVICES | Facility: HOSPITAL | Age: 59
LOS: 1 days | End: 2019-05-01
Payer: COMMERCIAL

## 2019-05-01 ENCOUNTER — APPOINTMENT (OUTPATIENT)
Dept: CT IMAGING | Facility: IMAGING CENTER | Age: 59
End: 2019-05-01

## 2019-05-01 ENCOUNTER — APPOINTMENT (OUTPATIENT)
Dept: NEUROSURGERY | Facility: CLINIC | Age: 59
End: 2019-05-01
Payer: MEDICARE

## 2019-05-01 DIAGNOSIS — I62.03 NONTRAUMATIC CHRONIC SUBDURAL HEMORRHAGE: ICD-10-CM

## 2019-05-01 PROCEDURE — 99212 OFFICE O/P EST SF 10 MIN: CPT

## 2019-05-01 PROCEDURE — 70450 CT HEAD/BRAIN W/O DYE: CPT | Mod: 26

## 2020-06-01 NOTE — OCCUPATIONAL THERAPY INITIAL EVALUATION ADULT - WEIGHT-BEARING RESTRICTIONS: BED/CHAIR, REHAB EVAL
Pt called and states she needs this refilled. Had video visit with Dr Garza on 5.18. Please advise.    full weight-bearing

## 2021-01-21 ENCOUNTER — APPOINTMENT (OUTPATIENT)
Dept: NEUROSURGERY | Facility: CLINIC | Age: 61
End: 2021-01-21
Payer: MEDICARE

## 2021-01-21 DIAGNOSIS — I62.03 NONTRAUMATIC CHRONIC SUBDURAL HEMORRHAGE: ICD-10-CM

## 2021-01-21 PROCEDURE — 99072 ADDL SUPL MATRL&STAF TM PHE: CPT

## 2021-01-21 PROCEDURE — 99213 OFFICE O/P EST LOW 20 MIN: CPT

## 2021-01-21 NOTE — ASSESSMENT
[Subdural hematoma, chronic (432.1 / I62.03)] : postoperative [FreeTextEntry1] : L chronic subdural hematoma s/p (12/23/2017) mini-craniotomy and evacuation of chronic subdural hematoma \par Severe migraine \par \par Plan: Follow up with MRI without contrast\par         Refer to Dr. Kirill Saez (Neurologist) for headache management.  \par

## 2021-01-21 NOTE — PHYSICAL EXAM
[General Appearance - Alert] : alert [General Appearance - In No Acute Distress] : in no acute distress [General Appearance - Well Nourished] : well nourished [Longitudinal] : longitudinal [Well-Healed] : well-healed [Oriented To Time, Place, And Person] : oriented to person, place, and time [Impaired Insight] : insight and judgment were intact [Affect] : the affect was normal [Motor Tone] : muscle tone was normal in all four extremities [Motor Strength] : muscle strength was normal in all four extremities [Involuntary Movements] : no involuntary movements were seen [Abnormal Walk] : normal gait [Balance] : balance was intact [FreeTextEntry1] : Left frontal

## 2021-01-21 NOTE — REASON FOR VISIT
[Follow-Up: _____] : a [unfilled] follow-up visit [Family Member] : family member [FreeTextEntry1] : 59 yo RHF, \par L chronic subdural hematoma s/p (12/23/2017) mini-craniotomy and evacuation of chronic subdural hematoma \par \par Last CT scan was done on 06/02/2020, which showed no recurrence of subdural hematoma and no other pathological lesion. \par \par Her chief complaint is worsening of headache. \par Pulsatile, throbbing pain on vertex with tenderness as well. \par It gets worse by sound or light. Intermittent but getting more often (since the last scan). \par \par Had otc Tyrenol and Topiramate for headache control but did not improve at all. \par Topiramate: increased up to 50mg twice a day. (Dr. Carole Lerman, Neurologist)

## 2021-01-21 NOTE — REVIEW OF SYSTEMS
[Negative] : Neurological [Dizziness] : dizziness [Vertigo] : vertigo [Migraine Headache] : migraine headaches [Facial Weakness] : no facial weakness [Arm Weakness] : no arm weakness [Hand Weakness] : no hand weakness [Leg Weakness] : no leg weakness [Poor Coordination] : good coordination [Difficulty Writing] : no difficulty writing [Difficulties in Speech] : no speech difficulties [Numbness] : no numbness [Tingling] : no tingling [Seizures] : no convulsions [Fainting] : no fainting [Lightheadedness] : no lightheadedness [Difficulty Walking] : no difficulty walking [Inability to Walk] : able to walk

## 2021-05-19 ENCOUNTER — APPOINTMENT (OUTPATIENT)
Dept: PAIN MANAGEMENT | Facility: CLINIC | Age: 61
End: 2021-05-19
Payer: MEDICARE

## 2021-05-19 VITALS
HEART RATE: 57 BPM | WEIGHT: 250 LBS | SYSTOLIC BLOOD PRESSURE: 157 MMHG | HEIGHT: 69 IN | DIASTOLIC BLOOD PRESSURE: 73 MMHG | BODY MASS INDEX: 37.03 KG/M2

## 2021-05-19 DIAGNOSIS — R51.9 HEADACHE, UNSPECIFIED: ICD-10-CM

## 2021-05-19 PROCEDURE — 99204 OFFICE O/P NEW MOD 45 MIN: CPT

## 2021-05-23 NOTE — HISTORY OF PRESENT ILLNESS
[FreeTextEntry1] : Location: headaches top left side\par Description:throbbing\par Onset:2018\par Triggers:noises\par Alleviating factors: lack of sound\par History of concussion No \par Aura: none;\par Associated - nausea, -vomiting, +photophobia, +phonophobia, -osmophobia; dizziness (lightheaded)\par Frequency:  daily\par Severity:   6  / 10 avg,  8 /10  worst,  3 / 10 best\par Interferes with function: QOL\par Comorbidities: No depression, anxiety; insomnia due to headache and to remain sleeping\par Imaging: MRI / CT brain, MRI / CT Cervical spine\par Prior medications:none\par Current medications: topamax 25 bid no relief; tyl prn when severe\par Interventions:\par Family hlstory; none\par PMH: None for headaches; DM, HTN controlled\par \par 2 weeks before SDH had mild frontal headahces followed by stumbling, - dx SDH. Still continues with headaches. despite surgery 2018

## 2021-05-23 NOTE — ASSESSMENT
[FreeTextEntry1] : SDH\par Cognitive impairment\par \par Taper topamax\par Initiate Pamelor 10 mg qhs. Advised of AEs

## 2021-07-07 ENCOUNTER — RX RENEWAL (OUTPATIENT)
Age: 61
End: 2021-07-07

## 2021-07-08 ENCOUNTER — APPOINTMENT (OUTPATIENT)
Dept: PAIN MANAGEMENT | Facility: CLINIC | Age: 61
End: 2021-07-08

## 2021-07-14 ENCOUNTER — RX RENEWAL (OUTPATIENT)
Age: 61
End: 2021-07-14

## 2021-07-22 NOTE — PROGRESS NOTE ADULT - SUBJECTIVE AND OBJECTIVE BOX
Show Aperture Variable?: Yes Consent: The patient's verbal consent was obtained including but not limited to risks of crusting, scabbing, blistering, scarring, darker or lighter pigmentary change, recurrence, incomplete removal and infection. Minimal headache.    Awakens to voice. Fully oriented, fluent and follows. No drift. Full strength. Render Note In Bullet Format When Appropriate: No Detail Level: Simple Post-Care Instructions: I reviewed post-care instructions with the patient in detail: pt should expect treated lesion(s) to form a scab for ~1 week, pt may apply Vaseline or Aquaphor to crusted or scabbing areas if desired. Advised to monitor and return to clinic for re-treatment or biopsy if lesion(s) remain unresolved in ~2 months. Duration Of Freeze Thaw-Cycle (Seconds): 0 Number Of Freeze-Thaw Cycles: 1 freeze-thaw cycle

## 2021-07-27 ENCOUNTER — APPOINTMENT (OUTPATIENT)
Dept: PAIN MANAGEMENT | Facility: CLINIC | Age: 61
End: 2021-07-27
Payer: MEDICARE

## 2021-07-27 VITALS
DIASTOLIC BLOOD PRESSURE: 81 MMHG | SYSTOLIC BLOOD PRESSURE: 150 MMHG | BODY MASS INDEX: 37.03 KG/M2 | HEART RATE: 74 BPM | WEIGHT: 250 LBS | HEIGHT: 69 IN

## 2021-07-27 PROCEDURE — 99212 OFFICE O/P EST SF 10 MIN: CPT

## 2021-07-27 RX ORDER — LEVETIRACETAM 500 MG/1
500 TABLET, FILM COATED ORAL
Qty: 120 | Refills: 0 | Status: DISCONTINUED | COMMUNITY
Start: 2018-01-03 | End: 2021-07-27

## 2021-07-27 NOTE — ASSESSMENT
[FreeTextEntry1] : Add Magnesium 400mg at night \par Will order UDS to prepare for possibility of MM. - pt will schedule an appt with Dr Macario. \par Consider Ubrelvy .\par \par

## 2021-07-27 NOTE — HISTORY OF PRESENT ILLNESS
[FreeTextEntry1] : Pt returns today for a follow up visit for headaches. s/p Subdural hematoma removal in 2018. \par Pt is taking Nortriptyline 10mg - 2 tabs in AM and 2 tabs in the evening. Pt explains the night time dose is helpful with sleep . Unfortunately, pt continues to have daytime headaches with sensitivity to light and sound. \par \par Pt is asking for medical marijuana  to abort migraine ?? \par \par  [Headache] : headache [Nausea] : nausea [Photophobia] : photophobia [Phonophobia] : phonophobia [Daily] : daily

## 2021-09-08 ENCOUNTER — RX RENEWAL (OUTPATIENT)
Age: 61
End: 2021-09-08

## 2021-10-07 ENCOUNTER — APPOINTMENT (OUTPATIENT)
Dept: PAIN MANAGEMENT | Facility: CLINIC | Age: 61
End: 2021-10-07

## 2021-11-03 ENCOUNTER — APPOINTMENT (OUTPATIENT)
Dept: PAIN MANAGEMENT | Facility: CLINIC | Age: 61
End: 2021-11-03

## 2021-11-30 ENCOUNTER — INPATIENT (INPATIENT)
Facility: HOSPITAL | Age: 61
LOS: 3 days | Discharge: HOME | End: 2021-12-04
Attending: INTERNAL MEDICINE | Admitting: INTERNAL MEDICINE
Payer: MEDICARE

## 2021-11-30 VITALS
RESPIRATION RATE: 18 BRPM | OXYGEN SATURATION: 98 % | HEART RATE: 72 BPM | TEMPERATURE: 98 F | SYSTOLIC BLOOD PRESSURE: 158 MMHG | DIASTOLIC BLOOD PRESSURE: 83 MMHG | HEIGHT: 69 IN

## 2021-11-30 DIAGNOSIS — K85.90 ACUTE PANCREATITIS WITHOUT NECROSIS OR INFECTION, UNSPECIFIED: ICD-10-CM

## 2021-11-30 LAB
ALBUMIN SERPL ELPH-MCNC: 4.5 G/DL — SIGNIFICANT CHANGE UP (ref 3.3–5)
ALP SERPL-CCNC: 78 U/L — SIGNIFICANT CHANGE UP (ref 40–120)
ALT FLD-CCNC: 15 U/L — SIGNIFICANT CHANGE UP (ref 4–33)
ANION GAP SERPL CALC-SCNC: 13 MMOL/L — SIGNIFICANT CHANGE UP (ref 7–14)
APPEARANCE UR: CLEAR — SIGNIFICANT CHANGE UP
AST SERPL-CCNC: 15 U/L — SIGNIFICANT CHANGE UP (ref 4–32)
BASOPHILS # BLD AUTO: 0.03 K/UL — SIGNIFICANT CHANGE UP (ref 0–0.2)
BASOPHILS NFR BLD AUTO: 0.3 % — SIGNIFICANT CHANGE UP (ref 0–2)
BILIRUB SERPL-MCNC: 0.4 MG/DL — SIGNIFICANT CHANGE UP (ref 0.2–1.2)
BILIRUB UR-MCNC: NEGATIVE — SIGNIFICANT CHANGE UP
BUN SERPL-MCNC: 24 MG/DL — HIGH (ref 7–23)
CALCIUM SERPL-MCNC: 10.2 MG/DL — SIGNIFICANT CHANGE UP (ref 8.4–10.5)
CHLORIDE SERPL-SCNC: 98 MMOL/L — SIGNIFICANT CHANGE UP (ref 98–107)
CHOLEST SERPL-MCNC: 189 MG/DL — SIGNIFICANT CHANGE UP
CO2 SERPL-SCNC: 27 MMOL/L — SIGNIFICANT CHANGE UP (ref 22–31)
COLOR SPEC: YELLOW — SIGNIFICANT CHANGE UP
CREAT SERPL-MCNC: 1.27 MG/DL — SIGNIFICANT CHANGE UP (ref 0.5–1.3)
DIFF PNL FLD: NEGATIVE — SIGNIFICANT CHANGE UP
EOSINOPHIL # BLD AUTO: 0.01 K/UL — SIGNIFICANT CHANGE UP (ref 0–0.5)
EOSINOPHIL NFR BLD AUTO: 0.1 % — SIGNIFICANT CHANGE UP (ref 0–6)
GLUCOSE SERPL-MCNC: 172 MG/DL — HIGH (ref 70–99)
GLUCOSE UR QL: NEGATIVE — SIGNIFICANT CHANGE UP
HCT VFR BLD CALC: 39.6 % — SIGNIFICANT CHANGE UP (ref 34.5–45)
HDLC SERPL-MCNC: 43 MG/DL — LOW
HGB BLD-MCNC: 13.4 G/DL — SIGNIFICANT CHANGE UP (ref 11.5–15.5)
IANC: 7.24 K/UL — SIGNIFICANT CHANGE UP (ref 1.5–8.5)
IMM GRANULOCYTES NFR BLD AUTO: 0.6 % — SIGNIFICANT CHANGE UP (ref 0–1.5)
KETONES UR-MCNC: ABNORMAL
LEUKOCYTE ESTERASE UR-ACNC: NEGATIVE — SIGNIFICANT CHANGE UP
LIDOCAIN IGE QN: 58 U/L — SIGNIFICANT CHANGE UP (ref 7–60)
LIPID PNL WITH DIRECT LDL SERPL: 128 MG/DL — HIGH
LYMPHOCYTES # BLD AUTO: 1.42 K/UL — SIGNIFICANT CHANGE UP (ref 1–3.3)
LYMPHOCYTES # BLD AUTO: 15.2 % — SIGNIFICANT CHANGE UP (ref 13–44)
MCHC RBC-ENTMCNC: 29.3 PG — SIGNIFICANT CHANGE UP (ref 27–34)
MCHC RBC-ENTMCNC: 33.8 GM/DL — SIGNIFICANT CHANGE UP (ref 32–36)
MCV RBC AUTO: 86.7 FL — SIGNIFICANT CHANGE UP (ref 80–100)
MONOCYTES # BLD AUTO: 0.6 K/UL — SIGNIFICANT CHANGE UP (ref 0–0.9)
MONOCYTES NFR BLD AUTO: 6.4 % — SIGNIFICANT CHANGE UP (ref 2–14)
NEUTROPHILS # BLD AUTO: 7.24 K/UL — SIGNIFICANT CHANGE UP (ref 1.8–7.4)
NEUTROPHILS NFR BLD AUTO: 77.4 % — HIGH (ref 43–77)
NITRITE UR-MCNC: NEGATIVE — SIGNIFICANT CHANGE UP
NON HDL CHOLESTEROL: 146 MG/DL — HIGH
NRBC # BLD: 0 /100 WBCS — SIGNIFICANT CHANGE UP
NRBC # FLD: 0 K/UL — SIGNIFICANT CHANGE UP
PH UR: 6 — SIGNIFICANT CHANGE UP (ref 5–8)
PLATELET # BLD AUTO: 317 K/UL — SIGNIFICANT CHANGE UP (ref 150–400)
POTASSIUM SERPL-MCNC: 4 MMOL/L — SIGNIFICANT CHANGE UP (ref 3.5–5.3)
POTASSIUM SERPL-SCNC: 4 MMOL/L — SIGNIFICANT CHANGE UP (ref 3.5–5.3)
PROT SERPL-MCNC: 9.1 G/DL — HIGH (ref 6–8.3)
PROT UR-MCNC: ABNORMAL
RBC # BLD: 4.57 M/UL — SIGNIFICANT CHANGE UP (ref 3.8–5.2)
RBC # FLD: 14.6 % — HIGH (ref 10.3–14.5)
SODIUM SERPL-SCNC: 138 MMOL/L — SIGNIFICANT CHANGE UP (ref 135–145)
SP GR SPEC: >1.05 (ref 1–1.05)
TRIGL SERPL-MCNC: 89 MG/DL — SIGNIFICANT CHANGE UP
UROBILINOGEN FLD QL: SIGNIFICANT CHANGE UP
WBC # BLD: 9.36 K/UL — SIGNIFICANT CHANGE UP (ref 3.8–10.5)
WBC # FLD AUTO: 9.36 K/UL — SIGNIFICANT CHANGE UP (ref 3.8–10.5)

## 2021-11-30 PROCEDURE — 99285 EMERGENCY DEPT VISIT HI MDM: CPT

## 2021-11-30 PROCEDURE — 76705 ECHO EXAM OF ABDOMEN: CPT | Mod: 26

## 2021-11-30 PROCEDURE — 99223 1ST HOSP IP/OBS HIGH 75: CPT

## 2021-11-30 RX ORDER — SODIUM CHLORIDE 9 MG/ML
1000 INJECTION INTRAMUSCULAR; INTRAVENOUS; SUBCUTANEOUS ONCE
Refills: 0 | Status: COMPLETED | OUTPATIENT
Start: 2021-11-30 | End: 2021-11-30

## 2021-11-30 RX ORDER — ACETAMINOPHEN 500 MG
1000 TABLET ORAL ONCE
Refills: 0 | Status: COMPLETED | OUTPATIENT
Start: 2021-11-30 | End: 2021-11-30

## 2021-11-30 RX ORDER — SODIUM CHLORIDE 9 MG/ML
1000 INJECTION, SOLUTION INTRAVENOUS
Refills: 0 | Status: DISCONTINUED | OUTPATIENT
Start: 2021-11-30 | End: 2021-12-01

## 2021-11-30 RX ORDER — ONDANSETRON 8 MG/1
4 TABLET, FILM COATED ORAL ONCE
Refills: 0 | Status: COMPLETED | OUTPATIENT
Start: 2021-11-30 | End: 2021-11-30

## 2021-11-30 RX ADMIN — SODIUM CHLORIDE 1000 MILLILITER(S): 9 INJECTION INTRAMUSCULAR; INTRAVENOUS; SUBCUTANEOUS at 21:55

## 2021-11-30 RX ADMIN — Medication 400 MILLIGRAM(S): at 21:55

## 2021-11-30 RX ADMIN — ONDANSETRON 4 MILLIGRAM(S): 8 TABLET, FILM COATED ORAL at 21:55

## 2021-11-30 NOTE — ED PROVIDER NOTE - CLINICAL SUMMARY MEDICAL DECISION MAKING FREE TEXT BOX
60 y/o female with pmhx of obesity presents to ED c/o abd pain and vomiting x 1 week. Pt was at [facility] today and diagnosed with pancreatitis on CT. Pt c/o epigastric pain and vomiting. Pt was going to be admitted at [facility] however her son said hospital wasn't good so came to ED instead. Pt states morphine makes her nauseous, has not eaten today. No diarrhea, fever, chills, cough, cp, sob. pt afebrile, well appearing, +abdomen tender. plan to check labs including lipid panel, lipase, RUQ sonogram r/o gallstones, hydrate, NPO, pain control, antiemetics, admit for pancreatitis

## 2021-11-30 NOTE — ED ADULT NURSE NOTE - NSICDXPASTMEDICALHX_GEN_ALL_CORE_FT
PAST MEDICAL HISTORY:  Diabetes mellitus of other type without complication, unspecified long term insulin use status     Essential hypertension     Pure hypercholesterolemia

## 2021-11-30 NOTE — ED PROVIDER NOTE - OBJECTIVE STATEMENT
62 y/o female with pmhx of obesity presents to ED c/o abd pain and vomiting x 1 week. Pt was at [facility] today and diagnosed with pancreatitis on CT. Pt c/o epigastric pain and vomiting. Pt was going to be admitted at [facility] however her son said hospital wasn't good so came to ED instead. Pt states morphine makes her nauseous, has not eaten today. No diarrhea, fever, chills, cough, cp, sob.

## 2021-11-30 NOTE — ED ADULT NURSE NOTE - CHIEF COMPLAINT QUOTE
Pt c/o abd pain and n/v x3 days. Reports "seen initially [facility] had CT with pancreatitis, was to be admtited but [facility] at no beds so left AMA." PMHX Gastric Sleeve, HTN, T2DM.
No lymphadedenopathy

## 2021-11-30 NOTE — ED ADULT TRIAGE NOTE - CHIEF COMPLAINT QUOTE
Pt c/o abd pain and n/v x3 days. Reports "seen initially [facility] had CT with pancreatitis, was to be admtited but [facility] at no beds so left AMA." PMHX Gastric Sleeve, HTN, T2DM.

## 2021-11-30 NOTE — ED PROVIDER NOTE - ATTENDING CONTRIBUTION TO CARE
agree with PA note    "62 y/o female with pmhx of obesity presents to ED c/o abd pain and vomiting x 1 week. Pt was at [facility] today and diagnosed with pancreatitis on CT. Pt c/o epigastric pain and vomiting. Pt was going to be admitted at [facility] however her son said hospital wasn't good so came to ED instead. Pt states morphine makes her nauseous, has not eaten today. No diarrhea, fever, chills, cough, cp, sob."    PE: uncomfortable in pain; VSS; CTAB/L; s1 s2 no m/r/g abd: LUQ tenderness ext: no edema    imp: given above and CT readings pancreatitis  IVF, pain control; US to r/o gallstones  likely admission

## 2021-12-01 DIAGNOSIS — E11.9 TYPE 2 DIABETES MELLITUS WITHOUT COMPLICATIONS: ICD-10-CM

## 2021-12-01 DIAGNOSIS — Z98.890 OTHER SPECIFIED POSTPROCEDURAL STATES: Chronic | ICD-10-CM

## 2021-12-01 DIAGNOSIS — E78.5 HYPERLIPIDEMIA, UNSPECIFIED: ICD-10-CM

## 2021-12-01 DIAGNOSIS — I10 ESSENTIAL (PRIMARY) HYPERTENSION: ICD-10-CM

## 2021-12-01 DIAGNOSIS — Z79.899 OTHER LONG TERM (CURRENT) DRUG THERAPY: ICD-10-CM

## 2021-12-01 DIAGNOSIS — K21.9 GASTRO-ESOPHAGEAL REFLUX DISEASE WITHOUT ESOPHAGITIS: ICD-10-CM

## 2021-12-01 DIAGNOSIS — Z29.9 ENCOUNTER FOR PROPHYLACTIC MEASURES, UNSPECIFIED: ICD-10-CM

## 2021-12-01 DIAGNOSIS — K85.90 ACUTE PANCREATITIS WITHOUT NECROSIS OR INFECTION, UNSPECIFIED: ICD-10-CM

## 2021-12-01 DIAGNOSIS — Z98.84 BARIATRIC SURGERY STATUS: Chronic | ICD-10-CM

## 2021-12-01 LAB
GLUCOSE BLDC GLUCOMTR-MCNC: 122 MG/DL — HIGH (ref 70–99)
GLUCOSE BLDC GLUCOMTR-MCNC: 140 MG/DL — HIGH (ref 70–99)
GLUCOSE BLDC GLUCOMTR-MCNC: 150 MG/DL — HIGH (ref 70–99)
GLUCOSE BLDC GLUCOMTR-MCNC: 95 MG/DL — SIGNIFICANT CHANGE UP (ref 70–99)
SARS-COV-2 RNA SPEC QL NAA+PROBE: SIGNIFICANT CHANGE UP

## 2021-12-01 PROCEDURE — 74177 CT ABD & PELVIS W/CONTRAST: CPT | Mod: 26

## 2021-12-01 PROCEDURE — 12345: CPT | Mod: NC,GC

## 2021-12-01 RX ORDER — DEXTROSE 50 % IN WATER 50 %
12.5 SYRINGE (ML) INTRAVENOUS ONCE
Refills: 0 | Status: DISCONTINUED | OUTPATIENT
Start: 2021-12-01 | End: 2021-12-04

## 2021-12-01 RX ORDER — SENNA PLUS 8.6 MG/1
2 TABLET ORAL AT BEDTIME
Refills: 0 | Status: DISCONTINUED | OUTPATIENT
Start: 2021-12-01 | End: 2021-12-04

## 2021-12-01 RX ORDER — SODIUM CHLORIDE 9 MG/ML
3 INJECTION INTRAMUSCULAR; INTRAVENOUS; SUBCUTANEOUS EVERY 8 HOURS
Refills: 0 | Status: DISCONTINUED | OUTPATIENT
Start: 2021-12-01 | End: 2021-12-04

## 2021-12-01 RX ORDER — DEXTROSE 50 % IN WATER 50 %
15 SYRINGE (ML) INTRAVENOUS ONCE
Refills: 0 | Status: DISCONTINUED | OUTPATIENT
Start: 2021-12-01 | End: 2021-12-04

## 2021-12-01 RX ORDER — ATENOLOL 25 MG/1
25 TABLET ORAL DAILY
Refills: 0 | Status: DISCONTINUED | OUTPATIENT
Start: 2021-12-01 | End: 2021-12-03

## 2021-12-01 RX ORDER — PANTOPRAZOLE SODIUM 20 MG/1
40 TABLET, DELAYED RELEASE ORAL
Refills: 0 | Status: DISCONTINUED | OUTPATIENT
Start: 2021-12-01 | End: 2021-12-04

## 2021-12-01 RX ORDER — ACETAMINOPHEN 500 MG
650 TABLET ORAL EVERY 6 HOURS
Refills: 0 | Status: DISCONTINUED | OUTPATIENT
Start: 2021-12-01 | End: 2021-12-04

## 2021-12-01 RX ORDER — ATORVASTATIN CALCIUM 80 MG/1
10 TABLET, FILM COATED ORAL AT BEDTIME
Refills: 0 | Status: DISCONTINUED | OUTPATIENT
Start: 2021-12-01 | End: 2021-12-04

## 2021-12-01 RX ORDER — INSULIN LISPRO 100/ML
VIAL (ML) SUBCUTANEOUS
Refills: 0 | Status: DISCONTINUED | OUTPATIENT
Start: 2021-12-01 | End: 2021-12-04

## 2021-12-01 RX ORDER — HEPARIN SODIUM 5000 [USP'U]/ML
5000 INJECTION INTRAVENOUS; SUBCUTANEOUS EVERY 8 HOURS
Refills: 0 | Status: DISCONTINUED | OUTPATIENT
Start: 2021-12-01 | End: 2021-12-04

## 2021-12-01 RX ORDER — INSULIN LISPRO 100/ML
VIAL (ML) SUBCUTANEOUS AT BEDTIME
Refills: 0 | Status: DISCONTINUED | OUTPATIENT
Start: 2021-12-01 | End: 2021-12-04

## 2021-12-01 RX ORDER — ONDANSETRON 8 MG/1
4 TABLET, FILM COATED ORAL EVERY 6 HOURS
Refills: 0 | Status: DISCONTINUED | OUTPATIENT
Start: 2021-12-01 | End: 2021-12-04

## 2021-12-01 RX ORDER — GLUCAGON INJECTION, SOLUTION 0.5 MG/.1ML
1 INJECTION, SOLUTION SUBCUTANEOUS ONCE
Refills: 0 | Status: DISCONTINUED | OUTPATIENT
Start: 2021-12-01 | End: 2021-12-04

## 2021-12-01 RX ORDER — CELECOXIB 200 MG/1
200 CAPSULE ORAL DAILY
Refills: 0 | Status: DISCONTINUED | OUTPATIENT
Start: 2021-12-01 | End: 2021-12-01

## 2021-12-01 RX ORDER — HYDROCHLOROTHIAZIDE 25 MG
25 TABLET ORAL DAILY
Refills: 0 | Status: DISCONTINUED | OUTPATIENT
Start: 2021-12-01 | End: 2021-12-01

## 2021-12-01 RX ORDER — DEXTROSE 50 % IN WATER 50 %
25 SYRINGE (ML) INTRAVENOUS ONCE
Refills: 0 | Status: DISCONTINUED | OUTPATIENT
Start: 2021-12-01 | End: 2021-12-04

## 2021-12-01 RX ORDER — SODIUM CHLORIDE 9 MG/ML
1000 INJECTION, SOLUTION INTRAVENOUS
Refills: 0 | Status: DISCONTINUED | OUTPATIENT
Start: 2021-12-01 | End: 2021-12-02

## 2021-12-01 RX ORDER — KETOROLAC TROMETHAMINE 30 MG/ML
15 SYRINGE (ML) INJECTION EVERY 6 HOURS
Refills: 0 | Status: DISCONTINUED | OUTPATIENT
Start: 2021-12-01 | End: 2021-12-04

## 2021-12-01 RX ORDER — SODIUM CHLORIDE 9 MG/ML
1000 INJECTION, SOLUTION INTRAVENOUS
Refills: 0 | Status: DISCONTINUED | OUTPATIENT
Start: 2021-12-01 | End: 2021-12-04

## 2021-12-01 RX ORDER — ACETAMINOPHEN 500 MG
1000 TABLET ORAL ONCE
Refills: 0 | Status: COMPLETED | OUTPATIENT
Start: 2021-12-01 | End: 2021-12-01

## 2021-12-01 RX ORDER — HEPARIN SODIUM 5000 [USP'U]/ML
5000 INJECTION INTRAVENOUS; SUBCUTANEOUS EVERY 12 HOURS
Refills: 0 | Status: DISCONTINUED | OUTPATIENT
Start: 2021-12-01 | End: 2021-12-01

## 2021-12-01 RX ADMIN — SODIUM CHLORIDE 250 MILLILITER(S): 9 INJECTION, SOLUTION INTRAVENOUS at 13:45

## 2021-12-01 RX ADMIN — HEPARIN SODIUM 5000 UNIT(S): 5000 INJECTION INTRAVENOUS; SUBCUTANEOUS at 13:45

## 2021-12-01 RX ADMIN — ATORVASTATIN CALCIUM 10 MILLIGRAM(S): 80 TABLET, FILM COATED ORAL at 22:19

## 2021-12-01 RX ADMIN — Medication 15 MILLIGRAM(S): at 22:19

## 2021-12-01 RX ADMIN — SODIUM CHLORIDE 3 MILLILITER(S): 9 INJECTION INTRAMUSCULAR; INTRAVENOUS; SUBCUTANEOUS at 22:27

## 2021-12-01 RX ADMIN — HEPARIN SODIUM 5000 UNIT(S): 5000 INJECTION INTRAVENOUS; SUBCUTANEOUS at 22:19

## 2021-12-01 RX ADMIN — Medication 400 MILLIGRAM(S): at 10:57

## 2021-12-01 RX ADMIN — Medication 15 MILLIGRAM(S): at 15:09

## 2021-12-01 RX ADMIN — HEPARIN SODIUM 5000 UNIT(S): 5000 INJECTION INTRAVENOUS; SUBCUTANEOUS at 06:45

## 2021-12-01 RX ADMIN — Medication 15 MILLIGRAM(S): at 14:39

## 2021-12-01 RX ADMIN — Medication 25 MILLIGRAM(S): at 06:45

## 2021-12-01 RX ADMIN — SODIUM CHLORIDE 250 MILLILITER(S): 9 INJECTION, SOLUTION INTRAVENOUS at 00:29

## 2021-12-01 RX ADMIN — PANTOPRAZOLE SODIUM 40 MILLIGRAM(S): 20 TABLET, DELAYED RELEASE ORAL at 06:44

## 2021-12-01 RX ADMIN — ATENOLOL 25 MILLIGRAM(S): 25 TABLET ORAL at 06:45

## 2021-12-01 RX ADMIN — SODIUM CHLORIDE 3 MILLILITER(S): 9 INJECTION INTRAMUSCULAR; INTRAVENOUS; SUBCUTANEOUS at 06:40

## 2021-12-01 RX ADMIN — Medication 15 MILLIGRAM(S): at 22:49

## 2021-12-01 RX ADMIN — SODIUM CHLORIDE 3 MILLILITER(S): 9 INJECTION INTRAMUSCULAR; INTRAVENOUS; SUBCUTANEOUS at 14:51

## 2021-12-01 NOTE — H&P ADULT - PROBLEM SELECTOR PLAN 4
Patient reports that she is on Metformin 500 mg BID.  Patient unable to recall other diabetic medications.  Will email [facility] pharmacist.  Patient placed on low dose insulin sliding scale.  Consistent carbohydrate when able to tolerate PO intake.  Hemoglobin a1c ordered with AM labs.  Monitor fingersticks.

## 2021-12-01 NOTE — PROGRESS NOTE ADULT - PROBLEM SELECTOR PLAN 5
Med rec pharmacist emailed to verify medications. f/u A1C. fs 140, 95, 174. On metformin, Glipizide, Pioglitazone at home  - med-rec assist by pharmacy  - hold oral antidiabetic agents  - low-dose ISS tid and qhs  - diet CC, DASH/TLC  - continue to monitor fingersticks

## 2021-12-01 NOTE — PROGRESS NOTE ADULT - PROBLEM SELECTOR PLAN 2
Patient on Atenolol 25 mg daily and HCTZ 25 mg.  Will continue with hold parameters.  DASH/TLC diet when not NPO. Hx chronic GERD a/w all foods  - c/w Protonix PO 40mg qd

## 2021-12-01 NOTE — H&P ADULT - NSICDXPASTMEDICALHX_GEN_ALL_CORE_FT
PAST MEDICAL HISTORY:  Diabetes mellitus of other type without complication, unspecified long term insulin use status     Essential hypertension     History of subdural hematoma     Pure hypercholesterolemia

## 2021-12-01 NOTE — H&P ADULT - NSHPSOCIALHISTORY_GEN_ALL_CORE
Patient denies use of cigarettes. Drinks socially. Patient is retired nurse. Patient former smoker 1-2 cig/day for 10 years in the past. Drinks socially. Patient is retired nurse.

## 2021-12-01 NOTE — H&P ADULT - PROBLEM SELECTOR PLAN 1
Admit to medicine, continue monitoring. Admit to medicine, continue monitoring.  CTAP w/IV contrast performed. Radiology paged for prelim  Lipase 58 vs 377 at [facility].  RUQ US negative for cholelithiasis.  Last drink before 11/25.  Patient NPO on LR at 250 cc/hr for 4 hours.  Will email GI consult if CTAP suggestive of Pancreatitis. Admit to medicine, continue monitoring.  CTAP w/IV contrast performed. Radiology paged for prelim  CTAP from [facility] showing peripancreatic inflammatory changes, suspicious for pancreatitis with differential including duodenitis.  Lipase 58 vs 377 at [facility].  RUQ US negative for cholelithiasis.  Last drink before 11/25.  Patient on clear liquid diet.  Patient received LR at 250 cc/hr for 4 hours.  Will email GI consult.

## 2021-12-01 NOTE — H&P ADULT - ATTENDING COMMENTS
This is a 62 y/o F with pmhx of obesity presented to the ED for epigastric pain for 1 week. The patient only endorsed 1 glass of wine recently during the holidays however she was still feeling unwell at the time. The patient had 2 days of vomiting, unable to tolerate PO intake. She went to [facility] and was found to be diagnosed with pancreatitis. The patient was not happy with the environment and the cleanliness there so she came to [facility] for further evaluation. Denies hx of gallstones.    Physical shows an middle aged obese woman, NAD but appears uncomfortable at bedside, lungs CTA b/l, cardiac s1s2 RRR no murmurs, abdomen exam shows epigastric  tenderness with palpation, nondistended, no masses appreciated, + surgical scar from  and gastric sleeve, no LE edema b/l.    Labs show a normal CBC, normal Chem7, triglycerides normal. CT from outside hospital shows focal prominence and heterogeneity of pancreatic head and proximal body with associated faint peripancreatic inflammatory changes.  Lipase from outside hospital was 377, lipase done here is 58.    The patient is admitted for pancreatitis work up. Pancreatitis could be idiopathic vs. secondary to home meds. Unlikely due to gallstones or ETOH because imaging does not show gallstones and no hx of heavy drinking. The patient was ordered a repeat CT abd/pelvis here. Since the patient is still symptomatic with a normal lipase, would benefit from repeat imaging evaluate for worsening pancreatitis or some other etiology. Will start high rate of LR. Will advance diet as tolerated. f/u CT abd/pelvis. Hold oral DM meds.

## 2021-12-01 NOTE — PROGRESS NOTE ADULT - PROBLEM SELECTOR PLAN 6
Heparin subcutaneous 5000 units q12h. DVT: SQH  Diet: ADAT to Regular  Dispo: pending medical improvement    Transitions of Care Status:  1.  Name of PCP:  2.  PCP Contacted on Admission: [ ] Y    [ ] N    3.  PCP contacted at Discharge: [ ] Y    [ ] N    [ ] N/A  4.  Post-Discharge Appointment Date and Location:  5.  Summary of Handoff given to PCP:

## 2021-12-01 NOTE — PROGRESS NOTE ADULT - SUBJECTIVE AND OBJECTIVE BOX
PROGRESS NOTE:   Authored By: Mickey Castillo M.D. (PGY-1)  [facility] Team 03  Available on TEAMS / Pager: [phone]    ***IN PROGRESS***    Patient is a 61y old  Female who presents with a chief complaint of Pancreatitis (01 Dec 2021 03:53)      OVERNIGHT EVENTS: No acute events overnight    SUBJECTIVE: Pt seen and examined at bedside this morning.     ADDITIONAL REVIEW OF SYSTEMS:    MEDICATIONS  (STANDING):  ATENolol  Tablet 25 milliGRAM(s) Oral daily  atorvastatin 10 milliGRAM(s) Oral at bedtime  celecoxib 200 milliGRAM(s) Oral daily  dextrose 40% Gel 15 Gram(s) Oral once  dextrose 5%. 1000 milliLiter(s) (50 mL/Hr) IV Continuous <Continuous>  dextrose 5%. 1000 milliLiter(s) (100 mL/Hr) IV Continuous <Continuous>  dextrose 50% Injectable 25 Gram(s) IV Push once  dextrose 50% Injectable 12.5 Gram(s) IV Push once  dextrose 50% Injectable 25 Gram(s) IV Push once  glucagon  Injectable 1 milliGRAM(s) IntraMuscular once  heparin   Injectable 5000 Unit(s) SubCutaneous every 12 hours  insulin lispro (ADMELOG) corrective regimen sliding scale   SubCutaneous three times a day before meals  insulin lispro (ADMELOG) corrective regimen sliding scale   SubCutaneous at bedtime  lactated ringers. 1000 milliLiter(s) (150 mL/Hr) IV Continuous <Continuous>  pantoprazole    Tablet 40 milliGRAM(s) Oral before breakfast  senna 2 Tablet(s) Oral at bedtime  sodium chloride 0.9% lock flush 3 milliLiter(s) IV Push every 8 hours    MEDICATIONS  (PRN):    CAPILLARY BLOOD GLUCOSE      POCT Blood Glucose.: 174 mg/dL (30 Nov 2021 21:05)    I&O's Summary      PHYSICAL EXAM:  Vital Signs Last 24 Hrs  T(C): 36.5 (01 Dec 2021 06:24), Max: 36.7 (30 Nov 2021 20:58)  T(F): 97.7 (01 Dec 2021 06:24), Max: 98 (30 Nov 2021 20:58)  HR: 65 (01 Dec 2021 06:24) (65 - 78)  BP: 138/58 (01 Dec 2021 06:24) (138/58 - 158/83)  BP(mean): --  RR: 17 (01 Dec 2021 06:24) (16 - 18)  SpO2: 100% (01 Dec 2021 06:24) (98% - 100%)    CONSTITUTIONAL: NAD, well-developed, well-groomed  EYES: PERRLA; conjunctiva and sclera clear  ENMT: Moist oral mucosa, no pharyngeal injection or exudates; normal dentition  NECK: Supple, no palpable masses; no thyromegaly  RESPIRATORY: Normal respiratory effort; lungs are clear to auscultation bilaterally  CARDIOVASCULAR: Regular rate and rhythm, normal S1 and S2, no murmur/rub/gallop; No lower extremity edema; Peripheral pulses are 2+ bilaterally  ABDOMEN: Normoactive bowel sounds, no rebound/guarding; No hepatosplenomegaly. Epigastric TTP  MUSCULOSKELETAL:  Normal gait; no clubbing or cyanosis of digits; no joint swelling or tenderness to palpation  PSYCH: A+O to person, place, and time; affect appropriate  NEUROLOGY: CN 2-12 are intact and symmetric; no gross sensory deficits   SKIN: No rashes; no palpable lesions    LABS:                        13.4   9.36  )-----------( 317      ( 30 Nov 2021 22:06 )             39.6     11-30    138  |  98  |  24<H>  ----------------------------<  172<H>  4.0   |  27  |  1.27    Ca    10.2      30 Nov 2021 22:06    TPro  9.1<H>  /  Alb  4.5  /  TBili  0.4  /  DBili  x   /  AST  15  /  ALT  15  /  AlkPhos  78  11-30          Urinalysis Basic - ( 30 Nov 2021 22:06 )    Color: Yellow / Appearance: Clear / SG: >1.050 / pH: x  Gluc: x / Ketone: Small  / Bili: Negative / Urobili: <2 mg/dL   Blood: x / Protein: Trace / Nitrite: Negative   Leuk Esterase: Negative / RBC: 0 /HPF / WBC 1 /HPF   Sq Epi: x / Non Sq Epi: 1 /HPF / Bacteria: Negative          RADIOLOGY & ADDITIONAL TESTS:    Results Reviewed:   Imaging Personally Reviewed:  Electrocardiogram Personally Reviewed:    COORDINATION OF CARE:  Care Discussed with Consultants/Other Providers [Y/N]:  Prior or Outpatient Records Reviewed [Y/N]:   PROGRESS NOTE:   Authored By: Mickey Castillo M.D. (PGY-1)  [facility] Team 03  Available on TEAMS / Pager: [phone]      Patient is a 61y old  Female who presents with a chief complaint of Pancreatitis (01 Dec 2021 03:53)    OVERNIGHT EVENTS: This morning, reports improving epigastric pain. Denies fevers, chills, nausea, vomiting, shortness of breath, chest pain, palpitations and leg swelling. Agrees to attempt to advance diet.     SUBJECTIVE: Pt seen and examined at bedside this morning.     ADDITIONAL REVIEW OF SYSTEMS:    MEDICATIONS  (STANDING):  ATENolol  Tablet 25 milliGRAM(s) Oral daily  atorvastatin 10 milliGRAM(s) Oral at bedtime  celecoxib 200 milliGRAM(s) Oral daily  dextrose 40% Gel 15 Gram(s) Oral once  dextrose 5%. 1000 milliLiter(s) (50 mL/Hr) IV Continuous <Continuous>  dextrose 5%. 1000 milliLiter(s) (100 mL/Hr) IV Continuous <Continuous>  dextrose 50% Injectable 25 Gram(s) IV Push once  dextrose 50% Injectable 12.5 Gram(s) IV Push once  dextrose 50% Injectable 25 Gram(s) IV Push once  glucagon  Injectable 1 milliGRAM(s) IntraMuscular once  heparin   Injectable 5000 Unit(s) SubCutaneous every 12 hours  insulin lispro (ADMELOG) corrective regimen sliding scale   SubCutaneous three times a day before meals  insulin lispro (ADMELOG) corrective regimen sliding scale   SubCutaneous at bedtime  lactated ringers. 1000 milliLiter(s) (150 mL/Hr) IV Continuous <Continuous>  pantoprazole    Tablet 40 milliGRAM(s) Oral before breakfast  senna 2 Tablet(s) Oral at bedtime  sodium chloride 0.9% lock flush 3 milliLiter(s) IV Push every 8 hours    MEDICATIONS  (PRN):    CAPILLARY BLOOD GLUCOSE      POCT Blood Glucose.: 174 mg/dL (30 Nov 2021 21:05)    I&O's Summary      PHYSICAL EXAM:  Vital Signs Last 24 Hrs  T(C): 36.5 (01 Dec 2021 06:24), Max: 36.7 (30 Nov 2021 20:58)  T(F): 97.7 (01 Dec 2021 06:24), Max: 98 (30 Nov 2021 20:58)  HR: 65 (01 Dec 2021 06:24) (65 - 78)  BP: 138/58 (01 Dec 2021 06:24) (138/58 - 158/83)  BP(mean): --  RR: 17 (01 Dec 2021 06:24) (16 - 18)  SpO2: 100% (01 Dec 2021 06:24) (98% - 100%)    CONSTITUTIONAL: NAD, well-developed, well-groomed  EYES: PERRLA; conjunctiva and sclera clear  ENMT: Moist oral mucosa, no pharyngeal injection or exudates; normal dentition  NECK: Supple, no palpable masses; no thyromegaly  RESPIRATORY: Normal respiratory effort; lungs are clear to auscultation bilaterally  CARDIOVASCULAR: Regular rate and rhythm, normal S1 and S2, no murmur/rub/gallop; No lower extremity edema; Peripheral pulses are 2+ bilaterally  ABDOMEN: Normoactive bowel sounds, no rebound/guarding; No hepatosplenomegaly. Epigastric TTP  MUSCULOSKELETAL:  Normal gait; no clubbing or cyanosis of digits; no joint swelling or tenderness to palpation  PSYCH: A+O to person, place, and time; affect appropriate  NEUROLOGY: CN 2-12 are intact and symmetric; no gross sensory deficits   SKIN: No rashes; no palpable lesions    LABS:                        13.4   9.36  )-----------( 317      ( 30 Nov 2021 22:06 )             39.6     11-30    138  |  98  |  24<H>  ----------------------------<  172<H>  4.0   |  27  |  1.27    Ca    10.2      30 Nov 2021 22:06    TPro  9.1<H>  /  Alb  4.5  /  TBili  0.4  /  DBili  x   /  AST  15  /  ALT  15  /  AlkPhos  78  11-30          Urinalysis Basic - ( 30 Nov 2021 22:06 )    Color: Yellow / Appearance: Clear / SG: >1.050 / pH: x  Gluc: x / Ketone: Small  / Bili: Negative / Urobili: <2 mg/dL   Blood: x / Protein: Trace / Nitrite: Negative   Leuk Esterase: Negative / RBC: 0 /HPF / WBC 1 /HPF   Sq Epi: x / Non Sq Epi: 1 /HPF / Bacteria: Negative          RADIOLOGY & ADDITIONAL TESTS:    Results Reviewed:   Imaging Personally Reviewed:  Electrocardiogram Personally Reviewed:    COORDINATION OF CARE:  Care Discussed with Consultants/Other Providers [Y/N]:  Prior or Outpatient Records Reviewed [Y/N]:

## 2021-12-01 NOTE — H&P ADULT - ASSESSMENT
62 y/o F with PMH of Obesity, Hypertension, Type 2 diabetes, on Metformin, Hyperlipidemia, Left subdural hematoma presents to the ED with complaint of epigastric abdominal pain and vomiting for 1 week. Admitted for pancreatitis.

## 2021-12-01 NOTE — PATIENT PROFILE ADULT - FALL HARM RISK - HARM RISK INTERVENTIONS

## 2021-12-01 NOTE — PROGRESS NOTE ADULT - PROBLEM SELECTOR PLAN 3
Lipid level 128.  Continue atorvastatin 10 mg daily. BP: 128/68 (01 Dec 2021 12:05) (128/68 - 158/83). Home Atenolol 25mg qd and HCTZ 25mg  - c/w hold Atenolol 25 mg qd  - hold HCTZ i/s/o potential medication-induced pancreatitis  - diet DASH/TLC and CC

## 2021-12-01 NOTE — PATIENT PROFILE ADULT - FUNCTIONAL ASSESSMENT - BASIC MOBILITY 1.
Spine appears normal, range of motion is not limited, no muscle or joint tenderness
4 = No assist / stand by assistance

## 2021-12-01 NOTE — H&P ADULT - HISTORY OF PRESENT ILLNESS
60 y/o F with PMH of Obesity, Hypertension, Type 2 diabetes, on Metformin, Hyperlipidemia, Left subdural hematoma presents to the ED with complaint of epigastric abdominal pain and vomiting for 1 week. Patient describes pain as sharp and constant. Patient reports that she went [facility] but was not satisfied with the care there so she came to [facility]. Patient report that she was diagnosed with pancreatitis at [facility]. Patient's documentation from [facility] showed Lipase of 377 and CTAP w/iv contrast done showed peripancreatic inflammatory changes, suspicious for pancreatitis with differential including duodenitis At time of interview patient denied abdominal pain, chest pain, nausea, vomiting or shortness of breath. Patient also denies fever and chills.   Lipase done at [facility] was found to be 58. CTAP w/IV contrast was performed. Triglycerides 89. 60 y/o F with PMH of Obesity, Hypertension, Type 2 diabetes, on Metformin, Hyperlipidemia, Left subdural hematoma presents to the ED with complaint of epigastric abdominal pain and vomiting for 1 week. Patient describes pain as sharp and constant. Patient reports that she went [facility] but was not satisfied with the care there so she came to [facility] Patient report that she was diagnosed with pancreatitis at [facility]. Patient's documentation from [facility] showed Lipase of 377 and CTAP w/iv contrast done showed peripancreatic inflammatory changes, suspicious for pancreatitis with differential including duodenitis At time of interview patient denied abdominal pain, chest pain, nausea, vomiting or shortness of breath. Patient also denies fever and chills. Patient states that she drinks socially. Patient reports last drink was before thanksgiving.  Lipase done at [facility] was found to be 58. CTAP w/IV contrast was performed. Triglycerides 89. 60 y/o F with PMH of Obesity, Hypertension, Type 2 diabetes, on Metformin, Hyperlipidemia, Left subdural hematoma presents to the ED with complaint of epigastric abdominal pain and vomiting for 1 week. Patient describes pain as sharp and constant. Patient reports that she went [facility] but was not satisfied with the care there so she came to [facility]. Patient report that she was diagnosed with pancreatitis at [facility]. Patient's documentation from [facility] showed Lipase of 377 and CTAP w/iv contrast done showed peripancreatic inflammatory changes, suspicious for pancreatitis with differential including duodenitis. At time of interview patient denied abdominal pain, chest pain, nausea, vomiting or shortness of breath. Patient also denies fever and chills. Patient states that she drinks socially. Patient reports last drink was before thanksgiving.  Lipase done at [facility] was found to be 58. CTAP w/IV contrast was performed. Triglycerides 89.

## 2021-12-01 NOTE — H&P ADULT - NSHPLABSRESULTS_GEN_ALL_CORE
Vital Signs Last 24 Hrs  T(C): 36.4 (01 Dec 2021 02:58), Max: 36.7 (30 Nov 2021 20:58)  T(F): 97.5 (01 Dec 2021 02:58), Max: 98 (30 Nov 2021 20:58)  HR: 78 (01 Dec 2021 02:58) (72 - 78)  BP: 143/76 (01 Dec 2021 02:58) (143/76 - 158/83)  BP(mean): --  RR: 16 (01 Dec 2021 02:58) (16 - 18)  SpO2: 98% (01 Dec 2021 02:58) (98% - 98%)                          13.4   9.36  )-----------( 317      ( 30 Nov 2021 22:06 )             39.6     11-30    138  |  98  |  24<H>  ----------------------------<  172<H>  4.0   |  27  |  1.27    Ca    10.2      30 Nov 2021 22:06    TPro  9.1<H>  /  Alb  4.5  /  TBili  0.4  /  DBili  x   /  AST  15  /  ALT  15  /  AlkPhos  78  11-30    Urinalysis Basic - ( 30 Nov 2021 22:06 )    Color: Yellow / Appearance: Clear / SG: >1.050 / pH: x  Gluc: x / Ketone: Small  / Bili: Negative / Urobili: <2 mg/dL   Blood: x / Protein: Trace / Nitrite: Negative   Leuk Esterase: Negative / RBC: 0 /HPF / WBC 1 /HPF   Sq Epi: x / Non Sq Epi: 1 /HPF / Bacteria: Negative    COVID 19 PCR: Negative.    US Abdomen RUQ:  FINDINGS:    Liver: Increased echogenicity.  Bile ducts: Normal caliber. Common bile duct measures 2 mm.  Gallbladder: Within normal limits. No cholelithiasis. Negative sonographic Bashir's sign, however patient received pain medications prior to evaluation.  Pancreas: Poorly visualized.  Right kidney: 10.5 cm. No hydronephrosis.  Ascites: None.  IVC: Visualized portions are within normal limits.    IMPRESSION:    No cholelithiasis or sonographic evidence of cholecystitis.  Steatosis.    EKG:

## 2021-12-01 NOTE — PHARMACOTHERAPY INTERVENTION NOTE - COMMENTS
Unable to verify meds with patient  However, meds verified with [facility] and [facility] as well as ins fill history and OMR updated accordingly

## 2021-12-01 NOTE — PROGRESS NOTE ADULT - PROBLEM SELECTOR PLAN 1
Admit to medicine, continue monitoring.  CTAP w/IV contrast performed. Radiology paged for prelim  CTAP from [facility] showing peripancreatic inflammatory changes, suspicious for pancreatitis with differential including duodenitis.  Lipase 58 vs 377 at [facility].  RUQ US negative for cholelithiasis.  Last drink before 11/25.  Patient on clear liquid diet.  Patient received LR at 250 cc/hr for 4 hours.  Will email GI consult. OSH Lipase 377 and CT-A/P graeme-pancreatic inflammatory changes c/w pancreatitis.   - CT-A/P w/ focal prominence and heterogeneity of pancreatic head and proximal body with associated faint peripancreatic inflammatory changes.   - f/u repeat CT-A/P  - Lipase 377-->58  - RUQ U/S negative for cholelithiasis, cholecystitis.   - w/u etiology - no cholecystitis; minimal EtOH hx (1-glass of wine during holidays); calcium wnl; TG wnl; no recent ERCP; med-rec notable for HCTZ use, held    - IVF goal 10-20cc/kg/hr, Wt 115kg - c/w LR @250cc/hr  - pain: Toradol 15 mg IVP q6hrs for moderate/severe pain  - CLD, ADAT to Regular   - i/s/o uncomplicated pancreatitis, cont. pain and IVF mgmt, hold on GI consult OSH Lipase 377 and CT-A/P graeme-pancreatic inflammatory changes c/w pancreatitis.   - CT-A/P w/ focal prominence and heterogeneity of pancreatic head and proximal body with associated faint peripancreatic inflammatory changes.   - f/u repeat CT-A/P  - Lipase 377-->58  - RUQ U/S negative for cholelithiasis, cholecystitis.   - w/u etiology - no cholecystitis; minimal EtOH hx (1-glass of wine during holidays); calcium wnl; TG wnl; no recent ERCP; med-rec notable for HCTZ use, held    - IVF goal 10-20cc/kg/hr, Wt 115kg - c/w LR @250cc/hr  - pain: PO Tylenol q6hrs for mild pain; Toradol 15 mg IVP q6hrs for moderate/severe pain  - CLD, ADAT to Regular   - i/s/o uncomplicated pancreatitis, cont. pain and IVF mgmt, hold on GI consult

## 2021-12-01 NOTE — H&P ADULT - NSICDXPASTSURGICALHX_GEN_ALL_CORE_FT
PAST SURGICAL HISTORY:  H/O bariatric surgery     H/O craniotomy 2017 secondary to subdural hematoma

## 2021-12-01 NOTE — H&P ADULT - PROBLEM SELECTOR PLAN 2
Patient on Atenolol 25 mg daily.  Will continue with hold parameters.  DASH/TLC diet when not NPO. Patient on Atenolol 25 mg daily and HCTZ 25 mg.  Will continue with hold parameters.  DASH/TLC diet when not NPO.

## 2021-12-01 NOTE — PROGRESS NOTE ADULT - PROBLEM SELECTOR PLAN 4
Patient reports that she is on Metformin 500 mg BID.  Patient unable to recall other diabetic medications.  Will email [facility] pharmacist.  Patient placed on low dose insulin sliding scale.  Consistent carbohydrate when able to tolerate PO intake.  Hemoglobin a1c ordered with AM labs.  Monitor fingersticks. , TG 89, HDL 43. On home atorvastatin  - c/w atorvastatin 10mg qd

## 2021-12-02 LAB
ANION GAP SERPL CALC-SCNC: 9 MMOL/L — SIGNIFICANT CHANGE UP (ref 7–14)
BASOPHILS # BLD AUTO: 0.04 K/UL — SIGNIFICANT CHANGE UP (ref 0–0.2)
BASOPHILS NFR BLD AUTO: 0.7 % — SIGNIFICANT CHANGE UP (ref 0–2)
BUN SERPL-MCNC: 19 MG/DL — SIGNIFICANT CHANGE UP (ref 7–23)
CALCIUM SERPL-MCNC: 9.5 MG/DL — SIGNIFICANT CHANGE UP (ref 8.4–10.5)
CHLORIDE SERPL-SCNC: 101 MMOL/L — SIGNIFICANT CHANGE UP (ref 98–107)
CO2 SERPL-SCNC: 28 MMOL/L — SIGNIFICANT CHANGE UP (ref 22–31)
CREAT SERPL-MCNC: 1.33 MG/DL — HIGH (ref 0.5–1.3)
EOSINOPHIL # BLD AUTO: 0.16 K/UL — SIGNIFICANT CHANGE UP (ref 0–0.5)
EOSINOPHIL NFR BLD AUTO: 2.6 % — SIGNIFICANT CHANGE UP (ref 0–6)
GLUCOSE BLDC GLUCOMTR-MCNC: 100 MG/DL — HIGH (ref 70–99)
GLUCOSE BLDC GLUCOMTR-MCNC: 107 MG/DL — HIGH (ref 70–99)
GLUCOSE BLDC GLUCOMTR-MCNC: 154 MG/DL — HIGH (ref 70–99)
GLUCOSE BLDC GLUCOMTR-MCNC: 156 MG/DL — HIGH (ref 70–99)
GLUCOSE SERPL-MCNC: 116 MG/DL — HIGH (ref 70–99)
HCT VFR BLD CALC: 37.5 % — SIGNIFICANT CHANGE UP (ref 34.5–45)
HGB BLD-MCNC: 12.6 G/DL — SIGNIFICANT CHANGE UP (ref 11.5–15.5)
IANC: 2.88 K/UL — SIGNIFICANT CHANGE UP (ref 1.5–8.5)
IMM GRANULOCYTES NFR BLD AUTO: 0.5 % — SIGNIFICANT CHANGE UP (ref 0–1.5)
LYMPHOCYTES # BLD AUTO: 2.41 K/UL — SIGNIFICANT CHANGE UP (ref 1–3.3)
LYMPHOCYTES # BLD AUTO: 39.3 % — SIGNIFICANT CHANGE UP (ref 13–44)
MAGNESIUM SERPL-MCNC: 2 MG/DL — SIGNIFICANT CHANGE UP (ref 1.6–2.6)
MCHC RBC-ENTMCNC: 29.7 PG — SIGNIFICANT CHANGE UP (ref 27–34)
MCHC RBC-ENTMCNC: 33.6 GM/DL — SIGNIFICANT CHANGE UP (ref 32–36)
MCV RBC AUTO: 88.4 FL — SIGNIFICANT CHANGE UP (ref 80–100)
MONOCYTES # BLD AUTO: 0.62 K/UL — SIGNIFICANT CHANGE UP (ref 0–0.9)
MONOCYTES NFR BLD AUTO: 10.1 % — SIGNIFICANT CHANGE UP (ref 2–14)
NEUTROPHILS # BLD AUTO: 2.88 K/UL — SIGNIFICANT CHANGE UP (ref 1.8–7.4)
NEUTROPHILS NFR BLD AUTO: 46.8 % — SIGNIFICANT CHANGE UP (ref 43–77)
NRBC # BLD: 0 /100 WBCS — SIGNIFICANT CHANGE UP
NRBC # FLD: 0 K/UL — SIGNIFICANT CHANGE UP
PHOSPHATE SERPL-MCNC: 4 MG/DL — SIGNIFICANT CHANGE UP (ref 2.5–4.5)
PLATELET # BLD AUTO: 289 K/UL — SIGNIFICANT CHANGE UP (ref 150–400)
POTASSIUM SERPL-MCNC: 3.9 MMOL/L — SIGNIFICANT CHANGE UP (ref 3.5–5.3)
POTASSIUM SERPL-SCNC: 3.9 MMOL/L — SIGNIFICANT CHANGE UP (ref 3.5–5.3)
RBC # BLD: 4.24 M/UL — SIGNIFICANT CHANGE UP (ref 3.8–5.2)
RBC # FLD: 14.1 % — SIGNIFICANT CHANGE UP (ref 10.3–14.5)
SODIUM SERPL-SCNC: 138 MMOL/L — SIGNIFICANT CHANGE UP (ref 135–145)
WBC # BLD: 6.14 K/UL — SIGNIFICANT CHANGE UP (ref 3.8–10.5)
WBC # FLD AUTO: 6.14 K/UL — SIGNIFICANT CHANGE UP (ref 3.8–10.5)

## 2021-12-02 PROCEDURE — 99233 SBSQ HOSP IP/OBS HIGH 50: CPT | Mod: GC

## 2021-12-02 RX ADMIN — Medication 15 MILLIGRAM(S): at 12:37

## 2021-12-02 RX ADMIN — SODIUM CHLORIDE 3 MILLILITER(S): 9 INJECTION INTRAMUSCULAR; INTRAVENOUS; SUBCUTANEOUS at 05:58

## 2021-12-02 RX ADMIN — HEPARIN SODIUM 5000 UNIT(S): 5000 INJECTION INTRAVENOUS; SUBCUTANEOUS at 06:00

## 2021-12-02 RX ADMIN — SODIUM CHLORIDE 3 MILLILITER(S): 9 INJECTION INTRAMUSCULAR; INTRAVENOUS; SUBCUTANEOUS at 21:29

## 2021-12-02 RX ADMIN — Medication 15 MILLIGRAM(S): at 21:29

## 2021-12-02 RX ADMIN — PANTOPRAZOLE SODIUM 40 MILLIGRAM(S): 20 TABLET, DELAYED RELEASE ORAL at 06:00

## 2021-12-02 RX ADMIN — SODIUM CHLORIDE 3 MILLILITER(S): 9 INJECTION INTRAMUSCULAR; INTRAVENOUS; SUBCUTANEOUS at 14:02

## 2021-12-02 RX ADMIN — Medication 15 MILLIGRAM(S): at 21:59

## 2021-12-02 RX ADMIN — HEPARIN SODIUM 5000 UNIT(S): 5000 INJECTION INTRAVENOUS; SUBCUTANEOUS at 21:30

## 2021-12-02 RX ADMIN — ATORVASTATIN CALCIUM 10 MILLIGRAM(S): 80 TABLET, FILM COATED ORAL at 21:29

## 2021-12-02 RX ADMIN — ATENOLOL 25 MILLIGRAM(S): 25 TABLET ORAL at 06:00

## 2021-12-02 RX ADMIN — Medication 1: at 09:06

## 2021-12-02 RX ADMIN — Medication 15 MILLIGRAM(S): at 12:23

## 2021-12-02 RX ADMIN — Medication 15 MILLIGRAM(S): at 06:16

## 2021-12-02 RX ADMIN — HEPARIN SODIUM 5000 UNIT(S): 5000 INJECTION INTRAVENOUS; SUBCUTANEOUS at 13:31

## 2021-12-02 RX ADMIN — Medication 1: at 12:22

## 2021-12-02 RX ADMIN — Medication 15 MILLIGRAM(S): at 06:46

## 2021-12-02 NOTE — PROGRESS NOTE ADULT - PROBLEM SELECTOR PLAN 5
f/u A1C. fs 140, 95, 174. On metformin, Glipizide, Pioglitazone at home  - med-rec assist by pharmacy  - hold oral antidiabetic agents  - low-dose ISS tid and qhs  - diet CC, DASH/TLC  - continue to monitor fingersticks

## 2021-12-02 NOTE — PROGRESS NOTE ADULT - PROBLEM SELECTOR PLAN 1
OSH Lipase 377 and CT-A/P graeme-pancreatic inflammatory changes c/w pancreatitis.   - CT-A/P w/ focal prominence and heterogeneity of pancreatic head and proximal body with associated faint peripancreatic inflammatory changes.   - f/u repeat CT-A/P  - Lipase 377-->58  - RUQ U/S negative for cholelithiasis, cholecystitis.   - w/u etiology - no cholecystitis; minimal EtOH hx (1-glass of wine during holidays); calcium wnl; TG wnl; no recent ERCP; med-rec notable for HCTZ use, held    - IVF goal 10-20cc/kg/hr, Wt 115kg - c/w LR @250cc/hr  - pain: PO Tylenol q6hrs for mild pain; Toradol 15 mg IVP q6hrs for moderate/severe pain  - CLD, ADAT to Regular   - i/s/o uncomplicated pancreatitis, cont. pain and IVF mgmt, hold on GI consult OSH Lipase 377 and CT-A/P graeme-pancreatic inflammatory changes c/w pancreatitis.   - CT-A/P w/ focal prominence and heterogeneity of pancreatic head and proximal body with associated faint peripancreatic inflammatory changes.   - 12/01 CT-A/P w/ no acute abnormality in the abdomen and pelvis. No evidence of pancreatitis.  - Lipase 377-->58  - RUQ U/S negative for cholelithiasis, cholecystitis.   - w/u etiology - no cholecystitis; minimal EtOH hx (1-glass of wine during holidays); calcium wnl; TG wnl; no recent ERCP; med-rec notable for HCTZ use, held    - IVF goal 10-20cc/kg/hr, Wt 115kg - c/w LR @250cc/hr  - pain: PO Tylenol q6hrs for mild pain; Toradol 15 mg IVP q6hrs for moderate/severe pain  - CLD, ADAT to Regular   - i/s/o uncomplicated pancreatitis, cont. pain and IVF mgmt, hold on GI consult OSH Lipase 377 and CT-A/P graeme-pancreatic inflammatory changes c/w pancreatitis.   - CT-A/P w/ focal prominence and heterogeneity of pancreatic head and proximal body with associated faint peripancreatic inflammatory changes.   - 12/01 CT-A/P w/ no acute abnormality in the abdomen and pelvis. No evidence of pancreatitis.  - Lipase 377-->58  - RUQ U/S negative for cholelithiasis, cholecystitis.   - w/u etiology - no cholecystitis; minimal EtOH hx (1-glass of wine during holidays); calcium wnl; TG wnl; no recent ERCP; med-rec notable for HCTZ use, held    - IVF goal 10-20cc/kg/hr, Wt 115kg - c/w LR @250cc/hr  - continue to monitor fluid status, consider d/c IVF 12/03  - pain: PO Tylenol q6hrs for mild pain; Toradol 15 mg IVP q6hrs for moderate/severe pain  - CLD, ADAT to Regular   - i/s/o uncomplicated pancreatitis, cont. pain and IVF mgmt

## 2021-12-02 NOTE — PROGRESS NOTE ADULT - PROBLEM SELECTOR PLAN 6
DVT: SQH  Diet: ADAT to Regular  Dispo: pending medical improvement    Transitions of Care Status:  1.  Name of PCP:  2.  PCP Contacted on Admission: [ ] Y    [ ] N    3.  PCP contacted at Discharge: [ ] Y    [ ] N    [ ] N/A  4.  Post-Discharge Appointment Date and Location:  5.  Summary of Handoff given to PCP:

## 2021-12-02 NOTE — PROGRESS NOTE ADULT - PROBLEM SELECTOR PLAN 3
BP: 128/68 (01 Dec 2021 12:05) (128/68 - 158/83). Home Atenolol 25mg qd and HCTZ 25mg  - c/w hold Atenolol 25 mg qd  - hold HCTZ i/s/o potential medication-induced pancreatitis  - diet DASH/TLC and CC BP: 128/68 (01 Dec 2021 12:05) (128/68 - 158/83). Home Atenolol 25mg qd and HCTZ 25mg  -140; DBP 60-70  - resume Atenolol 25 mg PO qd  - hold HCTZ i/s/o potential medication-induced pancreatitis  - diet DASH/TLC and CC

## 2021-12-02 NOTE — PROGRESS NOTE ADULT - SUBJECTIVE AND OBJECTIVE BOX
PROGRESS NOTE:   Authored By: Mickey Castillo M.D. (PGY-1)  [facility] Team 03  Available on TEAMS / Pager: [phone]    ***IN PROGRESS***    Patient is a 61y old  Female who presents with a chief complaint of Pancreatitis (01 Dec 2021 07:37)      OVERNIGHT EVENTS: No acute events overnight    SUBJECTIVE: Pt seen and examined at bedside this morning.     ADDITIONAL REVIEW OF SYSTEMS:    MEDICATIONS  (STANDING):  ATENolol  Tablet 25 milliGRAM(s) Oral daily  atorvastatin 10 milliGRAM(s) Oral at bedtime  dextrose 40% Gel 15 Gram(s) Oral once  dextrose 5%. 1000 milliLiter(s) (50 mL/Hr) IV Continuous <Continuous>  dextrose 5%. 1000 milliLiter(s) (100 mL/Hr) IV Continuous <Continuous>  dextrose 50% Injectable 25 Gram(s) IV Push once  dextrose 50% Injectable 12.5 Gram(s) IV Push once  dextrose 50% Injectable 25 Gram(s) IV Push once  glucagon  Injectable 1 milliGRAM(s) IntraMuscular once  heparin   Injectable 5000 Unit(s) SubCutaneous every 8 hours  insulin lispro (ADMELOG) corrective regimen sliding scale   SubCutaneous three times a day before meals  insulin lispro (ADMELOG) corrective regimen sliding scale   SubCutaneous at bedtime  lactated ringers. 1000 milliLiter(s) (250 mL/Hr) IV Continuous <Continuous>  pantoprazole    Tablet 40 milliGRAM(s) Oral before breakfast  senna 2 Tablet(s) Oral at bedtime  sodium chloride 0.9% lock flush 3 milliLiter(s) IV Push every 8 hours    MEDICATIONS  (PRN):  acetaminophen     Tablet .. 650 milliGRAM(s) Oral every 6 hours PRN Mild Pain (1 - 3)  ketorolac   Injectable 15 milliGRAM(s) IV Push every 6 hours PRN Moderate/Severe Pain  ondansetron Injectable 4 milliGRAM(s) IV Push every 6 hours PRN Nausea and/or Vomiting    CAPILLARY BLOOD GLUCOSE      POCT Blood Glucose.: 150 mg/dL (01 Dec 2021 21:51)  POCT Blood Glucose.: 122 mg/dL (01 Dec 2021 17:45)  POCT Blood Glucose.: 140 mg/dL (01 Dec 2021 12:08)  POCT Blood Glucose.: 95 mg/dL (01 Dec 2021 08:36)    I&O's Summary      PHYSICAL EXAM:  Vital Signs Last 24 Hrs  T(C): 36.6 (02 Dec 2021 05:34), Max: 36.6 (02 Dec 2021 05:34)  T(F): 97.8 (02 Dec 2021 05:34), Max: 97.8 (02 Dec 2021 05:34)  HR: 60 (02 Dec 2021 05:34) (52 - 65)  BP: 132/65 (02 Dec 2021 05:34) (128/68 - 141/70)  BP(mean): --  RR: 18 (02 Dec 2021 05:34) (18 - 18)  SpO2: 99% (02 Dec 2021 05:34) (99% - 99%)    CONSTITUTIONAL: NAD, well-developed, well-groomed  EYES: PERRLA; conjunctiva and sclera clear  ENMT: Moist oral mucosa, no pharyngeal injection or exudates; normal dentition  NECK: Supple, no palpable masses; no thyromegaly  RESPIRATORY: Normal respiratory effort; lungs are clear to auscultation bilaterally  CARDIOVASCULAR: Regular rate and rhythm, normal S1 and S2, no murmur/rub/gallop; No lower extremity edema; Peripheral pulses are 2+ bilaterally  ABDOMEN: Normoactive bowel sounds, no rebound/guarding; No hepatosplenomegaly. Epigastric TTP  MUSCULOSKELETAL:  Normal gait; no clubbing or cyanosis of digits; no joint swelling or tenderness to palpation  PSYCH: A+O to person, place, and time; affect appropriate  NEUROLOGY: CN 2-12 are intact and symmetric; no gross sensory deficits   SKIN: No rashes; no palpable lesions    LABS:                        13.4   9.36  )-----------( 317      ( 30 Nov 2021 22:06 )             39.6     11-30    138  |  98  |  24<H>  ----------------------------<  172<H>  4.0   |  27  |  1.27    Ca    10.2      30 Nov 2021 22:06    TPro  9.1<H>  /  Alb  4.5  /  TBili  0.4  /  DBili  x   /  AST  15  /  ALT  15  /  AlkPhos  78  11-30          Urinalysis Basic - ( 30 Nov 2021 22:06 )    Color: Yellow / Appearance: Clear / SG: >1.050 / pH: x  Gluc: x / Ketone: Small  / Bili: Negative / Urobili: <2 mg/dL   Blood: x / Protein: Trace / Nitrite: Negative   Leuk Esterase: Negative / RBC: 0 /HPF / WBC 1 /HPF   Sq Epi: x / Non Sq Epi: 1 /HPF / Bacteria: Negative          RADIOLOGY & ADDITIONAL TESTS:    Results Reviewed:   Imaging Personally Reviewed:  Electrocardiogram Personally Reviewed:    COORDINATION OF CARE:  Care Discussed with Consultants/Other Providers [Y/N]:  Prior or Outpatient Records Reviewed [Y/N]:   PROGRESS NOTE:   Authored By: Mickey Castillo M.D. (PGY-1)  [facility] Team 03  Available on TEAMS / Pager: [phone]    Patient is a 61y old  Female who presents with a chief complaint of Pancreatitis (01 Dec 2021 07:37)    OVERNIGHT EVENTS: No acute events overnight. Tolerating liquid diet this morning, agrees to attempt to advance diet. Endorses decreasing pain now down to 5/10 epigastric pain with no radiation. +BM, w/ one loose stool overnight. Continued nausea, no vomiting     SUBJECTIVE: Pt seen and examined at bedside this morning.     ADDITIONAL REVIEW OF SYSTEMS:    MEDICATIONS  (STANDING):  ATENolol  Tablet 25 milliGRAM(s) Oral daily  atorvastatin 10 milliGRAM(s) Oral at bedtime  dextrose 40% Gel 15 Gram(s) Oral once  dextrose 5%. 1000 milliLiter(s) (50 mL/Hr) IV Continuous <Continuous>  dextrose 5%. 1000 milliLiter(s) (100 mL/Hr) IV Continuous <Continuous>  dextrose 50% Injectable 25 Gram(s) IV Push once  dextrose 50% Injectable 12.5 Gram(s) IV Push once  dextrose 50% Injectable 25 Gram(s) IV Push once  glucagon  Injectable 1 milliGRAM(s) IntraMuscular once  heparin   Injectable 5000 Unit(s) SubCutaneous every 8 hours  insulin lispro (ADMELOG) corrective regimen sliding scale   SubCutaneous three times a day before meals  insulin lispro (ADMELOG) corrective regimen sliding scale   SubCutaneous at bedtime  lactated ringers. 1000 milliLiter(s) (250 mL/Hr) IV Continuous <Continuous>  pantoprazole    Tablet 40 milliGRAM(s) Oral before breakfast  senna 2 Tablet(s) Oral at bedtime  sodium chloride 0.9% lock flush 3 milliLiter(s) IV Push every 8 hours    MEDICATIONS  (PRN):  acetaminophen     Tablet .. 650 milliGRAM(s) Oral every 6 hours PRN Mild Pain (1 - 3)  ketorolac   Injectable 15 milliGRAM(s) IV Push every 6 hours PRN Moderate/Severe Pain  ondansetron Injectable 4 milliGRAM(s) IV Push every 6 hours PRN Nausea and/or Vomiting    CAPILLARY BLOOD GLUCOSE      POCT Blood Glucose.: 150 mg/dL (01 Dec 2021 21:51)  POCT Blood Glucose.: 122 mg/dL (01 Dec 2021 17:45)  POCT Blood Glucose.: 140 mg/dL (01 Dec 2021 12:08)  POCT Blood Glucose.: 95 mg/dL (01 Dec 2021 08:36)    I&O's Summary      PHYSICAL EXAM:  Vital Signs Last 24 Hrs  T(C): 36.6 (02 Dec 2021 05:34), Max: 36.6 (02 Dec 2021 05:34)  T(F): 97.8 (02 Dec 2021 05:34), Max: 97.8 (02 Dec 2021 05:34)  HR: 60 (02 Dec 2021 05:34) (52 - 65)  BP: 132/65 (02 Dec 2021 05:34) (128/68 - 141/70)  BP(mean): --  RR: 18 (02 Dec 2021 05:34) (18 - 18)  SpO2: 99% (02 Dec 2021 05:34) (99% - 99%)    CONSTITUTIONAL: NAD, well-developed, well-groomed  EYES: PERRLA; conjunctiva and sclera clear  ENMT: Moist oral mucosa, no pharyngeal injection or exudates; normal dentition  NECK: Supple, no palpable masses; no thyromegaly  RESPIRATORY: Normal respiratory effort; lungs are clear to auscultation bilaterally  CARDIOVASCULAR: Regular rate and rhythm, normal S1 and S2, no murmur/rub/gallop; No lower extremity edema; Peripheral pulses are 2+ bilaterally  ABDOMEN: Normoactive bowel sounds, no rebound/guarding; No hepatosplenomegaly. Epigastric TTP  MUSCULOSKELETAL:  Normal gait; no clubbing or cyanosis of digits; no joint swelling or tenderness to palpation  PSYCH: A+O to person, place, and time; affect appropriate  NEUROLOGY: CN 2-12 are intact and symmetric; no gross sensory deficits   SKIN: No rashes; no palpable lesions    LABS:                        13.4   9.36  )-----------( 317      ( 30 Nov 2021 22:06 )             39.6     11-30    138  |  98  |  24<H>  ----------------------------<  172<H>  4.0   |  27  |  1.27    Ca    10.2      30 Nov 2021 22:06    TPro  9.1<H>  /  Alb  4.5  /  TBili  0.4  /  DBili  x   /  AST  15  /  ALT  15  /  AlkPhos  78  11-30          Urinalysis Basic - ( 30 Nov 2021 22:06 )    Color: Yellow / Appearance: Clear / SG: >1.050 / pH: x  Gluc: x / Ketone: Small  / Bili: Negative / Urobili: <2 mg/dL   Blood: x / Protein: Trace / Nitrite: Negative   Leuk Esterase: Negative / RBC: 0 /HPF / WBC 1 /HPF   Sq Epi: x / Non Sq Epi: 1 /HPF / Bacteria: Negative          RADIOLOGY & ADDITIONAL TESTS:    Results Reviewed:   Imaging Personally Reviewed:  Electrocardiogram Personally Reviewed:    COORDINATION OF CARE:  Care Discussed with Consultants/Other Providers [Y/N]:  Prior or Outpatient Records Reviewed [Y/N]:

## 2021-12-03 ENCOUNTER — TRANSCRIPTION ENCOUNTER (OUTPATIENT)
Age: 61
End: 2021-12-03

## 2021-12-03 LAB
ANION GAP SERPL CALC-SCNC: 10 MMOL/L — SIGNIFICANT CHANGE UP (ref 7–14)
BASOPHILS # BLD AUTO: 0.05 K/UL — SIGNIFICANT CHANGE UP (ref 0–0.2)
BASOPHILS NFR BLD AUTO: 0.7 % — SIGNIFICANT CHANGE UP (ref 0–2)
BUN SERPL-MCNC: 16 MG/DL — SIGNIFICANT CHANGE UP (ref 7–23)
CALCIUM SERPL-MCNC: 9.3 MG/DL — SIGNIFICANT CHANGE UP (ref 8.4–10.5)
CHLORIDE SERPL-SCNC: 98 MMOL/L — SIGNIFICANT CHANGE UP (ref 98–107)
CO2 SERPL-SCNC: 24 MMOL/L — SIGNIFICANT CHANGE UP (ref 22–31)
CREAT SERPL-MCNC: 1.13 MG/DL — SIGNIFICANT CHANGE UP (ref 0.5–1.3)
EOSINOPHIL # BLD AUTO: 0.13 K/UL — SIGNIFICANT CHANGE UP (ref 0–0.5)
EOSINOPHIL NFR BLD AUTO: 1.7 % — SIGNIFICANT CHANGE UP (ref 0–6)
GLUCOSE BLDC GLUCOMTR-MCNC: 131 MG/DL — HIGH (ref 70–99)
GLUCOSE BLDC GLUCOMTR-MCNC: 132 MG/DL — HIGH (ref 70–99)
GLUCOSE BLDC GLUCOMTR-MCNC: 139 MG/DL — HIGH (ref 70–99)
GLUCOSE BLDC GLUCOMTR-MCNC: 218 MG/DL — HIGH (ref 70–99)
GLUCOSE SERPL-MCNC: 101 MG/DL — HIGH (ref 70–99)
HCT VFR BLD CALC: 36.5 % — SIGNIFICANT CHANGE UP (ref 34.5–45)
HCV AB S/CO SERPL IA: 0.14 S/CO — SIGNIFICANT CHANGE UP (ref 0–0.99)
HCV AB SERPL-IMP: SIGNIFICANT CHANGE UP
HGB BLD-MCNC: 13 G/DL — SIGNIFICANT CHANGE UP (ref 11.5–15.5)
IANC: 3.76 K/UL — SIGNIFICANT CHANGE UP (ref 1.5–8.5)
IMM GRANULOCYTES NFR BLD AUTO: 0.4 % — SIGNIFICANT CHANGE UP (ref 0–1.5)
LYMPHOCYTES # BLD AUTO: 2.8 K/UL — SIGNIFICANT CHANGE UP (ref 1–3.3)
LYMPHOCYTES # BLD AUTO: 37.6 % — SIGNIFICANT CHANGE UP (ref 13–44)
MAGNESIUM SERPL-MCNC: 2 MG/DL — SIGNIFICANT CHANGE UP (ref 1.6–2.6)
MCHC RBC-ENTMCNC: 30.1 PG — SIGNIFICANT CHANGE UP (ref 27–34)
MCHC RBC-ENTMCNC: 35.6 GM/DL — SIGNIFICANT CHANGE UP (ref 32–36)
MCV RBC AUTO: 84.5 FL — SIGNIFICANT CHANGE UP (ref 80–100)
MONOCYTES # BLD AUTO: 0.68 K/UL — SIGNIFICANT CHANGE UP (ref 0–0.9)
MONOCYTES NFR BLD AUTO: 9.1 % — SIGNIFICANT CHANGE UP (ref 2–14)
NEUTROPHILS # BLD AUTO: 3.76 K/UL — SIGNIFICANT CHANGE UP (ref 1.8–7.4)
NEUTROPHILS NFR BLD AUTO: 50.5 % — SIGNIFICANT CHANGE UP (ref 43–77)
NRBC # BLD: 0 /100 WBCS — SIGNIFICANT CHANGE UP
NRBC # FLD: 0 K/UL — SIGNIFICANT CHANGE UP
PHOSPHATE SERPL-MCNC: SIGNIFICANT CHANGE UP MG/DL (ref 2.5–4.5)
PLATELET # BLD AUTO: 322 K/UL — SIGNIFICANT CHANGE UP (ref 150–400)
POTASSIUM SERPL-MCNC: SIGNIFICANT CHANGE UP MMOL/L (ref 3.5–5.3)
POTASSIUM SERPL-SCNC: SIGNIFICANT CHANGE UP MMOL/L (ref 3.5–5.3)
RBC # BLD: 4.32 M/UL — SIGNIFICANT CHANGE UP (ref 3.8–5.2)
RBC # FLD: 14.1 % — SIGNIFICANT CHANGE UP (ref 10.3–14.5)
SODIUM SERPL-SCNC: 132 MMOL/L — LOW (ref 135–145)
WBC # BLD: 7.45 K/UL — SIGNIFICANT CHANGE UP (ref 3.8–10.5)
WBC # FLD AUTO: 7.45 K/UL — SIGNIFICANT CHANGE UP (ref 3.8–10.5)

## 2021-12-03 PROCEDURE — 99233 SBSQ HOSP IP/OBS HIGH 50: CPT | Mod: GC

## 2021-12-03 PROCEDURE — 99222 1ST HOSP IP/OBS MODERATE 55: CPT

## 2021-12-03 RX ORDER — AMLODIPINE BESYLATE 2.5 MG/1
1 TABLET ORAL
Qty: 30 | Refills: 0
Start: 2021-12-03 | End: 2022-01-01

## 2021-12-03 RX ORDER — AMLODIPINE BESYLATE 2.5 MG/1
5 TABLET ORAL DAILY
Refills: 0 | Status: DISCONTINUED | OUTPATIENT
Start: 2021-12-03 | End: 2021-12-04

## 2021-12-03 RX ADMIN — ATORVASTATIN CALCIUM 10 MILLIGRAM(S): 80 TABLET, FILM COATED ORAL at 21:46

## 2021-12-03 RX ADMIN — Medication 650 MILLIGRAM(S): at 18:22

## 2021-12-03 RX ADMIN — ATENOLOL 25 MILLIGRAM(S): 25 TABLET ORAL at 06:52

## 2021-12-03 RX ADMIN — Medication 15 MILLIGRAM(S): at 12:31

## 2021-12-03 RX ADMIN — Medication 15 MILLIGRAM(S): at 04:58

## 2021-12-03 RX ADMIN — SODIUM CHLORIDE 3 MILLILITER(S): 9 INJECTION INTRAMUSCULAR; INTRAVENOUS; SUBCUTANEOUS at 06:25

## 2021-12-03 RX ADMIN — ONDANSETRON 4 MILLIGRAM(S): 8 TABLET, FILM COATED ORAL at 18:42

## 2021-12-03 RX ADMIN — HEPARIN SODIUM 5000 UNIT(S): 5000 INJECTION INTRAVENOUS; SUBCUTANEOUS at 13:09

## 2021-12-03 RX ADMIN — Medication 650 MILLIGRAM(S): at 17:48

## 2021-12-03 RX ADMIN — Medication 15 MILLIGRAM(S): at 04:28

## 2021-12-03 RX ADMIN — AMLODIPINE BESYLATE 5 MILLIGRAM(S): 2.5 TABLET ORAL at 12:21

## 2021-12-03 RX ADMIN — Medication 2: at 17:41

## 2021-12-03 RX ADMIN — HEPARIN SODIUM 5000 UNIT(S): 5000 INJECTION INTRAVENOUS; SUBCUTANEOUS at 21:50

## 2021-12-03 RX ADMIN — Medication 15 MILLIGRAM(S): at 12:18

## 2021-12-03 RX ADMIN — PANTOPRAZOLE SODIUM 40 MILLIGRAM(S): 20 TABLET, DELAYED RELEASE ORAL at 06:53

## 2021-12-03 RX ADMIN — SODIUM CHLORIDE 3 MILLILITER(S): 9 INJECTION INTRAMUSCULAR; INTRAVENOUS; SUBCUTANEOUS at 21:41

## 2021-12-03 RX ADMIN — SODIUM CHLORIDE 3 MILLILITER(S): 9 INJECTION INTRAMUSCULAR; INTRAVENOUS; SUBCUTANEOUS at 13:27

## 2021-12-03 RX ADMIN — Medication 650 MILLIGRAM(S): at 09:52

## 2021-12-03 RX ADMIN — Medication 650 MILLIGRAM(S): at 10:52

## 2021-12-03 RX ADMIN — HEPARIN SODIUM 5000 UNIT(S): 5000 INJECTION INTRAVENOUS; SUBCUTANEOUS at 06:53

## 2021-12-03 NOTE — PROGRESS NOTE ADULT - PROBLEM SELECTOR PLAN 1
OSH Lipase 377 and CT-A/P graeme-pancreatic inflammatory changes c/w pancreatitis.   - CT-A/P w/ focal prominence and heterogeneity of pancreatic head and proximal body with associated faint peripancreatic inflammatory changes.   - 12/01 CT-A/P w/ no acute abnormality in the abdomen and pelvis. No evidence of pancreatitis.  - Lipase 377-->58  - RUQ U/S negative for cholelithiasis, cholecystitis.   - w/u etiology - no cholecystitis; minimal EtOH hx (1-glass of wine during holidays); calcium wnl; TG wnl; no recent ERCP; med-rec notable for HCTZ use, held    - IVF goal 10-20cc/kg/hr, Wt 115kg - c/w LR @250cc/hr  - continue to monitor fluid status, consider d/c IVF 12/03  - pain: PO Tylenol q6hrs for mild pain; Toradol 15 mg IVP q6hrs for moderate/severe pain  - CLD, ADAT to Regular   - i/s/o uncomplicated pancreatitis, cont. pain and IVF mgmt OSH Lipase 377 and CT-A/P graeme-pancreatic inflammatory changes c/w pancreatitis.   - CT-A/P w/ focal prominence and heterogeneity of pancreatic head and proximal body with associated faint peripancreatic inflammatory changes.   - 12/01 CT-A/P w/ no acute abnormality in the abdomen and pelvis. No evidence of pancreatitis.  - Lipase 377-->58  - RUQ U/S negative for cholelithiasis, cholecystitis.   - w/u etiology - no cholecystitis; minimal EtOH hx (1-glass of wine during holidays); calcium wnl; TG wnl; no recent ERCP; med-rec notable for HCTZ use, held    - d/c IVF  - pain: PO Tylenol q6hrs for mild pain; Toradol 15 mg IVP q6hrs for moderate/severe pain  - Regular, ?tolerating  - c/o nausea, c/w Zofran 4q6hrs PRN

## 2021-12-03 NOTE — PROGRESS NOTE ADULT - PROBLEM SELECTOR PLAN 5
f/u A1C. fs 140, 95, 174. On metformin, Glipizide, Pioglitazone at home  - med-rec assist by pharmacy  - hold oral antidiabetic agents  - low-dose ISS tid and qhs  - diet CC, DASH/TLC  - continue to monitor fingersticks f/u A1C outpatient. fs 140, 95, 174. On metformin, Glipizide, Pioglitazone at home  - med-rec assist by pharmacy  - hold oral antidiabetic agents  - low-dose ISS tid and qhs  - diet CC, DASH/TLC

## 2021-12-03 NOTE — CONSULT NOTE ADULT - SUBJECTIVE AND OBJECTIVE BOX
Patient is a 61y old  Female who presents with a chief complaint of Pancreatitis (03 Dec 2021 07:15)      HPI:  62 y/o F with PMH of Obesity, Hypertension, Type 2 diabetes, on Metformin, Hyperlipidemia, Left subdural hematoma presents to the ED with complaint of epigastric abdominal pain and vomiting for 1 week. Patient describes pain as sharp and constant. Patient reports that she went [facility] but was not satisfied with the care there so she came to [facility]. Patient report that she was diagnosed with pancreatitis at [facility]. Patient's documentation from [facility] showed Lipase of 377 and CTAP w/iv contrast done showed peripancreatic inflammatory changes, suspicious for pancreatitis with differential including duodenitis. At time of interview patient denied abdominal pain, chest pain, nausea, vomiting or shortness of breath. Patient also denies fever and chills. Patient states that she drinks socially. Patient reports last drink was before thanksgiving.  Lipase done at [facility] was found to be 58. CTAP w/IV contrast was performed. Triglycerides 89. (01 Dec 2021 03:53)      Patient states pain was initially 10/10, improved to 7/10 with medication.  she reports difficulty with balance at baseline, ambulates with cane at times  daughter assists with cooking, driving      REVIEW OF SYSTEMS  Constitutional - No fever, No weight loss, No fatigue  HEENT - No eye pain, No visual disturbances, No difficulty hearing, No tinnitus, No vertigo, No neck pain  Respiratory - No cough, No wheezing, No shortness of breath  Cardiovascular - No chest pain, No palpitations  Gastrointestinal - No abdominal pain, No nausea, No vomiting, No diarrhea, No constipation  Genitourinary - No dysuria, No frequency, No hematuria, No incontinence  Neurological - No headaches, No memory loss, No loss of strength, No numbness, No tremors  Skin - No itching, No rashes, No lesions   Endocrine - No temperature intolerance  Musculoskeletal  +pain  Psychiatric - No depression, No anxiety    PAST MEDICAL & SURGICAL HISTORY  No pertinent past medical history    Essential hypertension    Pure hypercholesterolemia    Diabetes mellitus of other type without complication, unspecified long term insulin use status    History of subdural hematoma    No significant past surgical history    H/O bariatric surgery    H/O craniotomy      SOCIAL HISTORY  Smoking - former smoker  EtOH - occasional alcohol  Drugs - Denied    FUNCTIONAL HISTORY  Lives alone with her dog Cary (shitzu poodle mix), in apartment with elevator however patient prefers stairs.  Independent with cane at times  family assists with driving and cooking  patient is independent for cleaning, toileting, dressing     CURRENT FUNCTIONAL STATUS  ambulates supervision/cg without device    FAMILY HISTORY   FH: type 2 diabetes      RECENT LABS/IMAGING  CBC Full  -  ( 03 Dec 2021 04:30 )  WBC Count : 7.45 K/uL  RBC Count : 4.32 M/uL  Hemoglobin : 13.0 g/dL  Hematocrit : 36.5 %  Platelet Count - Automated : 322 K/uL  Mean Cell Volume : 84.5 fL  Mean Cell Hemoglobin : 30.1 pg  Mean Cell Hemoglobin Concentration : 35.6 gm/dL  Auto Neutrophil # : 3.76 K/uL  Auto Lymphocyte # : 2.80 K/uL  Auto Monocyte # : 0.68 K/uL  Auto Eosinophil # : 0.13 K/uL  Auto Basophil # : 0.05 K/uL  Auto Neutrophil % : 50.5 %  Auto Lymphocyte % : 37.6 %  Auto Monocyte % : 9.1 %  Auto Eosinophil % : 1.7 %  Auto Basophil % : 0.7 %    12-03    132<L>  |  98  |  16  ----------------------------<  101<H>  TNP   |  24  |  1.13    Ca    9.3      03 Dec 2021 04:30  Phos  TNP     12-03  Mg     2.00     12-03      VITALS  T(C): 36.6 (12-03-21 @ 12:15), Max: 36.7 (12-03-21 @ 06:23)  HR: 64 (12-03-21 @ 12:15) (52 - 65)  BP: 148/76 (12-03-21 @ 12:15) (125/54 - 148/76)  RR: 18 (12-03-21 @ 12:15) (18 - 18)  SpO2: 96% (12-03-21 @ 12:15) (96% - 100%)  Wt(kg): --    ALLERGIES  penicillin (Rash)      MEDICATIONS   acetaminophen     Tablet .. 650 milliGRAM(s) Oral every 6 hours PRN  aluminum hydroxide/magnesium hydroxide/simethicone Suspension 30 milliLiter(s) Oral every 6 hours PRN  amLODIPine   Tablet 5 milliGRAM(s) Oral daily  atorvastatin 10 milliGRAM(s) Oral at bedtime  dextrose 40% Gel 15 Gram(s) Oral once  dextrose 5%. 1000 milliLiter(s) IV Continuous <Continuous>  dextrose 5%. 1000 milliLiter(s) IV Continuous <Continuous>  dextrose 50% Injectable 25 Gram(s) IV Push once  dextrose 50% Injectable 12.5 Gram(s) IV Push once  dextrose 50% Injectable 25 Gram(s) IV Push once  glucagon  Injectable 1 milliGRAM(s) IntraMuscular once  heparin   Injectable 5000 Unit(s) SubCutaneous every 8 hours  insulin lispro (ADMELOG) corrective regimen sliding scale   SubCutaneous three times a day before meals  insulin lispro (ADMELOG) corrective regimen sliding scale   SubCutaneous at bedtime  ketorolac   Injectable 15 milliGRAM(s) IV Push every 6 hours PRN  ondansetron Injectable 4 milliGRAM(s) IV Push every 6 hours PRN  pantoprazole    Tablet 40 milliGRAM(s) Oral before breakfast  senna 2 Tablet(s) Oral at bedtime  sodium chloride 0.9% lock flush 3 milliLiter(s) IV Push every 8 hours      ----------------------------------------------------------------------------------------  PHYSICAL EXAM  Constitutional - NAD, Comfortable  HEENT - NCAT, EOMI  Neck - Supple, No limited ROM  Chest - no respiratory distress  Cardiovascular - RRR, S1S2   Abdomen - + ttp  Extremities - No C/C/E, No calf tenderness   Neurologic Exam -                    Cognitive - Awake, Alert, AAO to self, place, date, year, situation     Communication - Fluent, No dysarthria     Cranial Nerves - CN 2-12 intact     Motor -                      LEFT    UE - ShAB 5/5, EF 5/5, EE 5/5, WE 5/5,  5/5                    RIGHT UE - ShAB 5/5, EF 5/5, EE 5/5, WE 5/5,  5/5                    LEFT    LE - HF 5/5, KE 5/5, DF 5/5, PF 5/5                    RIGHT LE - HF 5/5, KE 5/5, DF 5/5, PF 5/5        Sensory - Intact to LT         Balance - WNL Static  Psychiatric - Mood stable, Affect WNL  ----------------------------------------------------------------------------------------  ASSESSMENT/PLAN  60 yo f admitted for pancreatitis  hx of gastric sleeve  Pain -tylenol, toradol iv prn, protonix   diet: regular consistent carb dash/tlc  htn: norvasc  DVT PPX - heparin sq  Rehab -   recommend bedside PT to prevent deconditioning and for assistive device evaluation  patient uses cane at home  ambulates at bedside today however loses balance at times, without device    anticipate discharge home with assistive device and outpatient therapy when medically cleared

## 2021-12-03 NOTE — PROVIDER CONTACT NOTE (OTHER) - ASSESSMENT
/54, HR 52 RR 18 T 97.7 oxygen sat RA 96%, not in distress, alert and responsive; c/o of abdominal pain 7/10

## 2021-12-03 NOTE — PROGRESS NOTE ADULT - NEGATIVE NEUROLOGICAL SYMPTOMS
no weakness/no syncope/no vertigo

## 2021-12-03 NOTE — DISCHARGE NOTE PROVIDER - CARE PROVIDER_API CALL
JOVANI JARQUIN [facility]  Internal Medicine  [id number] [id number]  Phone: [phone]  Fax: [phone]  Established Patient  Follow Up Time: 1 week

## 2021-12-03 NOTE — PROGRESS NOTE ADULT - PROBLEM SELECTOR PLAN 6
DVT: SQH  Diet: ADAT to Regular  Dispo: pending medical improvement    Transitions of Care Status:  1.  Name of PCP:  2.  PCP Contacted on Admission: [ ] Y    [ ] N    3.  PCP contacted at Discharge: [ ] Y    [ ] N    [ ] N/A  4.  Post-Discharge Appointment Date and Location:  5.  Summary of Handoff given to PCP: DVT: SQH  Diet: Regular  Dispo:     Transitions of Care Status:  1.  Name of PCP:  2.  PCP Contacted on Admission: [ ] Y    [ ] N    3.  PCP contacted at Discharge: [ ] Y    [ ] N    [ ] N/A  4.  Post-Discharge Appointment Date and Location:  5.  Summary of Handoff given to PCP:

## 2021-12-03 NOTE — DISCHARGE NOTE PROVIDER - NSDCCPCAREPLAN_GEN_ALL_CORE_FT
PRINCIPAL DISCHARGE DIAGNOSIS  Diagnosis: Pancreatitis  Assessment and Plan of Treatment:       SECONDARY DISCHARGE DIAGNOSES  Diagnosis: Chronic GERD  Assessment and Plan of Treatment:     Diagnosis: Hypertension  Assessment and Plan of Treatment:     Diagnosis: Hyperlipidemia  Assessment and Plan of Treatment:      PRINCIPAL DISCHARGE DIAGNOSIS  Diagnosis: Pancreatitis  Assessment and Plan of Treatment: Pancreatitis is a condition that can cause severe belly pain. The pancreas is an organ that makes hormones and juices that help break down food. Pancreatitis is the term for when this organ gets irritated or swollen. Most people get over pancreatitis without any long-lasting effects. But a few people get very sick. Pancreatitis is usually treated in the hospital. You were given fluids and pain medicines to help you feel better.   Please continue to take your medications as prescribed. Please follow up with your primary care provider within the next month. If you experience repeat symptoms of intense stomach pain, or feel like you are weak, light-headed or about to faint please return to the hospital.      SECONDARY DISCHARGE DIAGNOSES  Diagnosis: Hypertension  Assessment and Plan of Treatment: High blood pressure is a condition that puts you at risk for heart attack, stroke, and kidney disease. It does not usually cause symptoms. But it can be serious. You were previously prescribed hydrochlorothiazide as medication to treat your blood pressure. However, you are being discharged with a prescription for Amlodipine which may prove to be more efficacious for your high blood pressure. If it causes side effects, please do not just stop taking it. Instead, talk to your doctor or nurse about the problems it causes. They might be able to lower your dose or switch you to another medicine. If cost is a problem, mention that too. They might be able to put you on a less expensive medicine. Taking your blood pressure medicine can keep you from having a heart attack or stroke, and it can save your life!  You have a lot of control over your blood pressure. To lower it: Lose weight; Choose a diet low in fat and rich in fruits, vegetables, and low-fat dairy products; Reduce the amount of salt you eat; Do something active for at least 30 minutes a day on most days of the week; Cut down on alcohol (if you drink more than 2 alcoholic drinks per day).    Diagnosis: Hyperlipidemia  Assessment and Plan of Treatment: Cholesterol is a substance that is found in the blood. Everyone has some. It is needed for good health. The problem is, people sometimes have too much cholesterol. Compared with people with normal cholesterol, people with high cholesterol have a higher risk of heart attacks, strokes, and other health problems. The higher your cholesterol, the higher your risk of these problems.  Please continue to take your statin medication as prescribed. If you are experiencing any side effects, please do not stop taking it but instead talk to your doctor or healthcare provider about the problems it causes. You can further lower your cholesterol by: Avoiding red meat, butter, fried foods, cheese, and other foods that have a lot of saturated fat; Losing weight (if you are overweight); Being more active.     PRINCIPAL DISCHARGE DIAGNOSIS  Diagnosis: Pancreatitis  Assessment and Plan of Treatment: Pancreatitis is a condition that can cause severe belly pain. The pancreas is an organ that makes hormones and juices that help break down food. Pancreatitis is the term for when this organ gets irritated or swollen. Most people get over pancreatitis without any long-lasting effects. But a few people get very sick. Pancreatitis is usually treated in the hospital. You were given fluids and pain medicines to help you feel better.   Please continue to take your medications as prescribed. Please follow up with your primary care provider within the next month. If you experience repeat symptoms of intense stomach pain, or feel like you are weak, light-headed or about to faint please return to the hospital.  Although the cause of your pancreatitis is unknown, it may have been triggered from the drug you take called Hydrochlorothiazide. Please refrain from taking this drug in the future.      SECONDARY DISCHARGE DIAGNOSES  Diagnosis: Hypertension  Assessment and Plan of Treatment: High blood pressure is a condition that puts you at risk for heart attack, stroke, and kidney disease. It does not usually cause symptoms. But it can be serious. You were previously prescribed hydrochlorothiazide as medication to treat your blood pressure. However, you are being discharged with a prescription for Amlodipine which may prove to be more efficacious for your high blood pressure. If it causes side effects, please do not just stop taking it. Instead, talk to your doctor or nurse about the problems it causes. They might be able to lower your dose or switch you to another medicine. If cost is a problem, mention that too. They might be able to put you on a less expensive medicine. Taking your blood pressure medicine can keep you from having a heart attack or stroke, and it can save your life!  You should discontinue your atenolol, and take the new medication, amlodipine instead.   You have a lot of control over your blood pressure. To lower it: Lose weight; Choose a diet low in fat and rich in fruits, vegetables, and low-fat dairy products; Reduce the amount of salt you eat; Do something active for at least 30 minutes a day on most days of the week; Cut down on alcohol (if you drink more than 2 alcoholic drinks per day).    Diagnosis: Hyperlipidemia  Assessment and Plan of Treatment: Cholesterol is a substance that is found in the blood. Everyone has some. It is needed for good health. The problem is, people sometimes have too much cholesterol. Compared with people with normal cholesterol, people with high cholesterol have a higher risk of heart attacks, strokes, and other health problems. The higher your cholesterol, the higher your risk of these problems.  Please continue to take your statin medication as prescribed. If you are experiencing any side effects, please do not stop taking it but instead talk to your doctor or healthcare provider about the problems it causes. You can further lower your cholesterol by: Avoiding red meat, butter, fried foods, cheese, and other foods that have a lot of saturated fat; Losing weight (if you are overweight); Being more active.

## 2021-12-03 NOTE — DISCHARGE NOTE PROVIDER - PROVIDER TOKENS
PROVIDER:[TOKEN:[[id number]],FOLLOWUP:[1 week],ESTABLISHEDPATIENT:[T]]

## 2021-12-03 NOTE — PROGRESS NOTE ADULT - ASSESSMENT
60 y/o F with PMH of Obesity, Hypertension, Type 2 diabetes, on Metformin, Hyperlipidemia, Left subdural hematoma presents to the ED with complaint of epigastric abdominal pain and vomiting for 1 week. Admitted for pancreatitis.

## 2021-12-03 NOTE — PROGRESS NOTE ADULT - SUBJECTIVE AND OBJECTIVE BOX
PROGRESS NOTE:   Authored By: Mickey Castillo M.D. (PGY-1)  [facility] Team 03  Available on TEAMS / Pager: [phone]    ***IN PROGRESS***    Patient is a 61y old  Female who presents with a chief complaint of Pancreatitis (02 Dec 2021 07:14)      OVERNIGHT EVENTS: No acute events overnight    SUBJECTIVE: Pt seen and examined at bedside this morning.     ADDITIONAL REVIEW OF SYSTEMS:    MEDICATIONS  (STANDING):  ATENolol  Tablet 25 milliGRAM(s) Oral daily  atorvastatin 10 milliGRAM(s) Oral at bedtime  dextrose 40% Gel 15 Gram(s) Oral once  dextrose 5%. 1000 milliLiter(s) (50 mL/Hr) IV Continuous <Continuous>  dextrose 5%. 1000 milliLiter(s) (100 mL/Hr) IV Continuous <Continuous>  dextrose 50% Injectable 25 Gram(s) IV Push once  dextrose 50% Injectable 12.5 Gram(s) IV Push once  dextrose 50% Injectable 25 Gram(s) IV Push once  glucagon  Injectable 1 milliGRAM(s) IntraMuscular once  heparin   Injectable 5000 Unit(s) SubCutaneous every 8 hours  insulin lispro (ADMELOG) corrective regimen sliding scale   SubCutaneous three times a day before meals  insulin lispro (ADMELOG) corrective regimen sliding scale   SubCutaneous at bedtime  pantoprazole    Tablet 40 milliGRAM(s) Oral before breakfast  senna 2 Tablet(s) Oral at bedtime  sodium chloride 0.9% lock flush 3 milliLiter(s) IV Push every 8 hours    MEDICATIONS  (PRN):  acetaminophen     Tablet .. 650 milliGRAM(s) Oral every 6 hours PRN Mild Pain (1 - 3)  aluminum hydroxide/magnesium hydroxide/simethicone Suspension 30 milliLiter(s) Oral every 6 hours PRN Dyspepsia  ketorolac   Injectable 15 milliGRAM(s) IV Push every 6 hours PRN Moderate/Severe Pain  ondansetron Injectable 4 milliGRAM(s) IV Push every 6 hours PRN Nausea and/or Vomiting    CAPILLARY BLOOD GLUCOSE      POCT Blood Glucose.: 100 mg/dL (02 Dec 2021 21:45)  POCT Blood Glucose.: 107 mg/dL (02 Dec 2021 17:20)  POCT Blood Glucose.: 154 mg/dL (02 Dec 2021 11:53)  POCT Blood Glucose.: 156 mg/dL (02 Dec 2021 08:50)    I&O's Summary      PHYSICAL EXAM:  Vital Signs Last 24 Hrs  T(C): 36.7 (03 Dec 2021 06:23), Max: 36.7 (02 Dec 2021 11:50)  T(F): 98 (03 Dec 2021 06:23), Max: 98.1 (02 Dec 2021 11:50)  HR: 65 (03 Dec 2021 06:23) (52 - 65)  BP: 138/55 (03 Dec 2021 06:23) (125/54 - 142/60)  BP(mean): --  RR: 18 (03 Dec 2021 06:23) (17 - 18)  SpO2: 96% (03 Dec 2021 06:23) (96% - 100%)    CONSTITUTIONAL: NAD, well-developed, well-groomed  EYES: PERRLA; conjunctiva and sclera clear  ENMT: Moist oral mucosa, no pharyngeal injection or exudates; normal dentition  NECK: Supple, no palpable masses; no thyromegaly  RESPIRATORY: Normal respiratory effort; lungs are clear to auscultation bilaterally  CARDIOVASCULAR: Regular rate and rhythm, normal S1 and S2, no murmur/rub/gallop; No lower extremity edema; Peripheral pulses are 2+ bilaterally  ABDOMEN: Normoactive bowel sounds, no rebound/guarding; No hepatosplenomegaly. Epigastric TTP  MUSCULOSKELETAL:  Normal gait; no clubbing or cyanosis of digits; no joint swelling or tenderness to palpation  PSYCH: A+O to person, place, and time; affect appropriate  NEUROLOGY: CN 2-12 are intact and symmetric; no gross sensory deficits   SKIN: No rashes; no palpable lesions    LABS:                        13.0   7.45  )-----------( 322      ( 03 Dec 2021 04:30 )             36.5     12-03    132<L>  |  98  |  16  ----------------------------<  101<H>  TNP   |  24  |  1.13    Ca    9.3      03 Dec 2021 04:30  Phos  TNP     12-03  Mg     2.00     12-03                  RADIOLOGY & ADDITIONAL TESTS:    Results Reviewed:   Imaging Personally Reviewed:  Electrocardiogram Personally Reviewed:    COORDINATION OF CARE:  Care Discussed with Consultants/Other Providers [Y/N]:  Prior or Outpatient Records Reviewed [Y/N]:   PROGRESS NOTE:   Authored By: Mickey Castillo M.D. (PGY-1)  [facility] Team 03  Available on TEAMS / Pager: [phone]    ***IN PROGRESS***    Patient is a 61y old  Female who presents with a chief complaint of Pancreatitis (02 Dec 2021 07:14)    OVERNIGHT EVENTS: No acute events overnight    SUBJECTIVE: Pt seen and examined at bedside this morning.     ADDITIONAL REVIEW OF SYSTEMS:    MEDICATIONS  (STANDING):  ATENolol  Tablet 25 milliGRAM(s) Oral daily  atorvastatin 10 milliGRAM(s) Oral at bedtime  dextrose 40% Gel 15 Gram(s) Oral once  dextrose 5%. 1000 milliLiter(s) (50 mL/Hr) IV Continuous <Continuous>  dextrose 5%. 1000 milliLiter(s) (100 mL/Hr) IV Continuous <Continuous>  dextrose 50% Injectable 25 Gram(s) IV Push once  dextrose 50% Injectable 12.5 Gram(s) IV Push once  dextrose 50% Injectable 25 Gram(s) IV Push once  glucagon  Injectable 1 milliGRAM(s) IntraMuscular once  heparin   Injectable 5000 Unit(s) SubCutaneous every 8 hours  insulin lispro (ADMELOG) corrective regimen sliding scale   SubCutaneous three times a day before meals  insulin lispro (ADMELOG) corrective regimen sliding scale   SubCutaneous at bedtime  pantoprazole    Tablet 40 milliGRAM(s) Oral before breakfast  senna 2 Tablet(s) Oral at bedtime  sodium chloride 0.9% lock flush 3 milliLiter(s) IV Push every 8 hours    MEDICATIONS  (PRN):  acetaminophen     Tablet .. 650 milliGRAM(s) Oral every 6 hours PRN Mild Pain (1 - 3)  aluminum hydroxide/magnesium hydroxide/simethicone Suspension 30 milliLiter(s) Oral every 6 hours PRN Dyspepsia  ketorolac   Injectable 15 milliGRAM(s) IV Push every 6 hours PRN Moderate/Severe Pain  ondansetron Injectable 4 milliGRAM(s) IV Push every 6 hours PRN Nausea and/or Vomiting    CAPILLARY BLOOD GLUCOSE      POCT Blood Glucose.: 100 mg/dL (02 Dec 2021 21:45)  POCT Blood Glucose.: 107 mg/dL (02 Dec 2021 17:20)  POCT Blood Glucose.: 154 mg/dL (02 Dec 2021 11:53)  POCT Blood Glucose.: 156 mg/dL (02 Dec 2021 08:50)    I&O's Summary      PHYSICAL EXAM:  Vital Signs Last 24 Hrs  T(C): 36.7 (03 Dec 2021 06:23), Max: 36.7 (02 Dec 2021 11:50)  T(F): 98 (03 Dec 2021 06:23), Max: 98.1 (02 Dec 2021 11:50)  HR: 65 (03 Dec 2021 06:23) (52 - 65)  BP: 138/55 (03 Dec 2021 06:23) (125/54 - 142/60)  BP(mean): --  RR: 18 (03 Dec 2021 06:23) (17 - 18)  SpO2: 96% (03 Dec 2021 06:23) (96% - 100%)    CONSTITUTIONAL: NAD, well-developed, well-groomed  EYES: PERRLA; conjunctiva and sclera clear  ENMT: Moist oral mucosa, no pharyngeal injection or exudates; normal dentition  NECK: Supple, no palpable masses; no thyromegaly  RESPIRATORY: Normal respiratory effort; lungs are clear to auscultation bilaterally  CARDIOVASCULAR: Regular rate and rhythm, normal S1 and S2, no murmur/rub/gallop; No lower extremity edema; Peripheral pulses are 2+ bilaterally  ABDOMEN: Normoactive bowel sounds, no rebound/guarding; No hepatosplenomegaly. Epigastric TTP  MUSCULOSKELETAL:  Normal gait; no clubbing or cyanosis of digits; no joint swelling or tenderness to palpation  PSYCH: A+O to person, place, and time; affect appropriate  NEUROLOGY: CN 2-12 are intact and symmetric; no gross sensory deficits   SKIN: No rashes; no palpable lesions    LABS:                        13.0   7.45  )-----------( 322      ( 03 Dec 2021 04:30 )             36.5     12-03    132<L>  |  98  |  16  ----------------------------<  101<H>  TNP   |  24  |  1.13    Ca    9.3      03 Dec 2021 04:30  Phos  TNP     12-03  Mg     2.00     12-03                  RADIOLOGY & ADDITIONAL TESTS:    Results Reviewed:   Imaging Personally Reviewed:  Electrocardiogram Personally Reviewed:    COORDINATION OF CARE:  Care Discussed with Consultants/Other Providers [Y/N]:  Prior or Outpatient Records Reviewed [Y/N]:

## 2021-12-03 NOTE — DISCHARGE NOTE PROVIDER - HOSPITAL COURSE
61 year old woman with a history of obesity, hypertension, hyperlipidemia diabetes type 2 on metformin and LEFT subdural hematoma presents to the ED with acute epigastric abdominal pain and vomiting for one week. She describes the pain as sharp and continuous. Initially presented to [facility] and diagnosed with pancreatitis but unsatisfied with care and preferred to go to another hospital closer to her home residence, after which she presented to the [facility] ED. OSH lipase elevated to 377 and imaging demonstrating peripancreatic inflammatory changes. Upon admission to medicine, patient reports abdominal pain has resolved and denies fever, chills, chest pain, nausea, vomiting, diarrhea and shortness of breath. Repeat lipase down to 58 and repeat CT-A/P without evidence of pancreatitis. Workup of etiology for pancreatitis negative for cholecystitis, alcohol-induced, hypercalcemia, hypertriglyceridemia, and ERCP-induced. Given LR 250cc for maintenance intravenous fluids and NSAIDs as needed for pain, with no need for opiates during duration of visit. Diet was advanced from clears to full liquid to regular diet, CC and DASH/TLC and patient able to tolerate.     Medically stable for discharge. 61 year old woman with a history of obesity, hypertension, hyperlipidemia diabetes type 2 on metformin and LEFT subdural hematoma presents to the ED with acute epigastric abdominal pain and vomiting for one week. She describes the pain as sharp and continuous. Initially presented to [facility] and diagnosed with pancreatitis but unsatisfied with care and preferred to go to another hospital closer to her home residence, after which she presented to the [facility] ED. OSH lipase elevated to 377 and imaging demonstrating peripancreatic inflammatory changes. Upon admission to medicine, patient reports abdominal pain has resolved and denies fever, chills, chest pain, nausea, vomiting, diarrhea and shortness of breath. Repeat lipase down to 58 and repeat CT-A/P without evidence of pancreatitis. Workup of etiology for pancreatitis negative for cholecystitis, alcohol-induced, hypercalcemia, hypertriglyceridemia, and ERCP-induced. Given LR 250cc for maintenance intravenous fluids and NSAIDs as needed for pain, with no need for opiates during duration of visit. Diet was advanced from clears to full liquid to regular diet, CC and DASH/TLC and patient able to tolerate.     Medically stable for discharge.

## 2021-12-03 NOTE — PROGRESS NOTE ADULT - NEGATIVE ENDOCRINE SYMPTOMS
no cold intolerance/no heat intolerance

## 2021-12-03 NOTE — PROGRESS NOTE ADULT - PROBLEM SELECTOR PLAN 3
BP: 128/68 (01 Dec 2021 12:05) (128/68 - 158/83). Home Atenolol 25mg qd and HCTZ 25mg  -140; DBP 60-70  - resume Atenolol 25 mg PO qd  - hold HCTZ i/s/o potential medication-induced pancreatitis  - diet DASH/TLC and CC BP: 138/55 (03 Dec 2021 06:23) (125/54 - 142/60). Home Atenolol 25mg qd and HCTZ 25mg  -140; DBP 60-70  - resume Atenolol 25 mg PO qd  - hold HCTZ i/s/o potential medication-induced pancreatitis  - diet DASH/TLC and CC

## 2021-12-03 NOTE — PROGRESS NOTE ADULT - NEGATIVE LYMPHATIC SYMPTOMS
no enlarged lymph nodes/no tender lymph nodes

## 2021-12-03 NOTE — DISCHARGE NOTE PROVIDER - NSDCMRMEDTOKEN_GEN_ALL_CORE_FT
amLODIPine 5 mg oral tablet: 1 tab(s) orally once a day  atenolol 25 mg oral tablet: 1 tab(s) orally once a day  atorvastatin 10 mg oral tablet: 1 tab(s) orally once a day (at bedtime)  glipiZIDE 10 mg oral tablet: 1 tab(s) orally 2 times a day (before meals)  meloxicam 7.5 mg oral tablet: 1 tab(s) orally once a day  metFORMIN 500 mg oral tablet: 1 tab(s) orally 2 times a day (with meals)  nortriptyline 10 mg oral capsule: 2 cap(s) orally once a day (at bedtime)    *of note, per pharmacy, last dispensed on 10/15/2021 for 30 day supply   pantoprazole 40 mg oral delayed release tablet: 1 tab(s) orally once a day  pioglitazone 15 mg oral tablet: 1 tab(s) orally once a day   amLODIPine 5 mg oral tablet: 1 tab(s) orally once a day  atorvastatin 10 mg oral tablet: 1 tab(s) orally once a day (at bedtime)  glipiZIDE 10 mg oral tablet: 1 tab(s) orally 2 times a day (before meals)  meloxicam 7.5 mg oral tablet: 1 tab(s) orally once a day  metFORMIN 500 mg oral tablet: 1 tab(s) orally 2 times a day (with meals)  nortriptyline 10 mg oral capsule: 2 cap(s) orally once a day (at bedtime)    *of note, per pharmacy, last dispensed on 10/15/2021 for 30 day supply   pantoprazole 40 mg oral delayed release tablet: 1 tab(s) orally once a day  pioglitazone 15 mg oral tablet: 1 tab(s) orally once a day   amLODIPine 5 mg oral tablet: 1 tab(s) orally once a day  atorvastatin 10 mg oral tablet: 1 tab(s) orally once a day (at bedtime)  glipiZIDE 10 mg oral tablet: 1 tab(s) orally 2 times a day (before meals)  meloxicam 7.5 mg oral tablet: 1 tab(s) orally once a day  metFORMIN 500 mg oral tablet: 1 tab(s) orally 2 times a day (with meals)  nortriptyline 10 mg oral capsule: 2 cap(s) orally once a day (at bedtime)    *of note, per pharmacy, last dispensed on 10/15/2021 for 30 day supply   pantoprazole 40 mg oral delayed release tablet: 1 tab(s) orally once a day  physical therapy: Physical Therapy 3x a week for 6-8 weeks.  pioglitazone 15 mg oral tablet: 1 tab(s) orally once a day

## 2021-12-04 ENCOUNTER — TRANSCRIPTION ENCOUNTER (OUTPATIENT)
Age: 61
End: 2021-12-04

## 2021-12-04 VITALS
DIASTOLIC BLOOD PRESSURE: 74 MMHG | TEMPERATURE: 97 F | RESPIRATION RATE: 18 BRPM | OXYGEN SATURATION: 100 % | HEART RATE: 62 BPM | SYSTOLIC BLOOD PRESSURE: 145 MMHG

## 2021-12-04 LAB
GLUCOSE BLDC GLUCOMTR-MCNC: 140 MG/DL — HIGH (ref 70–99)
GLUCOSE BLDC GLUCOMTR-MCNC: 150 MG/DL — HIGH (ref 70–99)
GLUCOSE BLDC GLUCOMTR-MCNC: 174 MG/DL — HIGH (ref 70–99)

## 2021-12-04 PROCEDURE — 99239 HOSP IP/OBS DSCHRG MGMT >30: CPT | Mod: GC

## 2021-12-04 RX ADMIN — HEPARIN SODIUM 5000 UNIT(S): 5000 INJECTION INTRAVENOUS; SUBCUTANEOUS at 14:52

## 2021-12-04 RX ADMIN — Medication 15 MILLIGRAM(S): at 02:35

## 2021-12-04 RX ADMIN — Medication 15 MILLIGRAM(S): at 02:20

## 2021-12-04 RX ADMIN — PANTOPRAZOLE SODIUM 40 MILLIGRAM(S): 20 TABLET, DELAYED RELEASE ORAL at 06:00

## 2021-12-04 RX ADMIN — AMLODIPINE BESYLATE 5 MILLIGRAM(S): 2.5 TABLET ORAL at 05:44

## 2021-12-04 RX ADMIN — SODIUM CHLORIDE 3 MILLILITER(S): 9 INJECTION INTRAMUSCULAR; INTRAVENOUS; SUBCUTANEOUS at 13:02

## 2021-12-04 RX ADMIN — Medication 15 MILLIGRAM(S): at 15:35

## 2021-12-04 RX ADMIN — ONDANSETRON 4 MILLIGRAM(S): 8 TABLET, FILM COATED ORAL at 13:07

## 2021-12-04 RX ADMIN — Medication 15 MILLIGRAM(S): at 15:20

## 2021-12-04 RX ADMIN — HEPARIN SODIUM 5000 UNIT(S): 5000 INJECTION INTRAVENOUS; SUBCUTANEOUS at 05:44

## 2021-12-04 RX ADMIN — Medication 1: at 18:03

## 2021-12-04 RX ADMIN — SODIUM CHLORIDE 3 MILLILITER(S): 9 INJECTION INTRAMUSCULAR; INTRAVENOUS; SUBCUTANEOUS at 05:48

## 2021-12-04 RX ADMIN — Medication 650 MILLIGRAM(S): at 10:20

## 2021-12-04 RX ADMIN — Medication 650 MILLIGRAM(S): at 09:20

## 2021-12-04 NOTE — PROGRESS NOTE ADULT - ASSESSMENT
62 y/o F with PMH of Obesity, Hypertension, Type 2 diabetes, on Metformin, Hyperlipidemia, Left subdural hematoma presents to the ED with complaint of epigastric abdominal pain and vomiting for 1 week. Admitted for pancreatitis. 60 y/o F with PMH of Obesity, Hypertension, Type 2 diabetes, on Metformin, Hyperlipidemia, Left subdural hematoma presents to the ED with complaint of epigastric abdominal pain and vomiting for 1 week. Admitted for pancreatitis, medically stable with improvement in abdominal pain, pending discharge.

## 2021-12-04 NOTE — PROGRESS NOTE ADULT - ATTENDING COMMENTS
60 yo F with obesity, DM2, HTN, HLD, prior L SDH recent hospitalization for abdominal pain at OSH found to have pancreatitis based on imaging, pain and elevated lipase, left AMA, now presented to [facility] for further management. Hemodynamically stable, CT here without abnormality. Pain well controlled. Tolerating advancing diet to regular diabetic diet. Can proceed with DC planning.     Patient is clinically and hemodynamically stable for DC.  CM informed of DC plan.   DC planning time: 34 min in coordinating DC plan with patient and team.
62 yo F with obesity, DM2, HTN, HLD, prior L SDH recent hospitalization for abdominal pain at OSH found to have pancreatitis based on imaging, pain and elevated lipase, left AMA, now presented to [facility] for further management. Hemodynamically stable, CT here without abnormality. Pain slowly improving, continue with IVF and pain regimen. control. Advancing diet to regular diabetic diet for lunch today, continue to monitor. Remainder as above.
62 yo F with obesity, DM2, HTN, HLD, prior L SDH recent hospitalization for abdominal pain at OSH found to have pancreatitis based on imaging, pain and elevated lipase, left AMA, now presented to [facility] for further management. Hemodynamically stable, CT here without abnormality. Continue with IVF, pain control, advance diet as tolerated. Remainder as above.
60 yo F with obesity, DM2, HTN, HLD, prior L SDH recent hospitalization for abdominal pain at OSH found to have pancreatitis based on imaging, pain and elevated lipase, left AMA, now presented to [facility] for further management. Hemodynamically stable, CT here without abnormality. Pain slowly improving, continue with IVF, pain control, and continue to advance diet as tolerated. Remainder as above.

## 2021-12-04 NOTE — PROGRESS NOTE ADULT - PROBLEM SELECTOR PLAN 3
BP: 138/55 (03 Dec 2021 06:23) (125/54 - 142/60). Home Atenolol 25mg qd and HCTZ 25mg  -140; DBP 60-70  - resume Atenolol 25 mg PO qd  - hold HCTZ i/s/o potential medication-induced pancreatitis  - diet DASH/TLC and CC

## 2021-12-04 NOTE — DISCHARGE NOTE NURSING/CASE MANAGEMENT/SOCIAL WORK - NSDCPEFALRISK_GEN_ALL_CORE
For information on Fall & Injury Prevention, visit: [email or web address] OR  [email or web address] OR  https://www.cdc.gov/steadi/patient.html

## 2021-12-04 NOTE — PROGRESS NOTE ADULT - SUBJECTIVE AND OBJECTIVE BOX
PROGRESS NOTE:   Authored by Dr. Valentine Ortiz MD (PGY-2). Pager [facility] [phone] / [facility] [id number]    Patient is a 61y old  Female who presents with a chief complaint of Pancreatitis (03 Dec 2021 14:38)      SUBJECTIVE / OVERNIGHT EVENTS:  No acute events overnight.     ADDITIONAL REVIEW OF SYSTEMS:  Patient denies fevers, chills, chest pain, shortness of breath, nausea, abdominal pain, diarrhea, constipation, dysuria, leg swelling, headache, light headedness.    MEDICATIONS  (STANDING):  amLODIPine   Tablet 5 milliGRAM(s) Oral daily  atorvastatin 10 milliGRAM(s) Oral at bedtime  dextrose 40% Gel 15 Gram(s) Oral once  dextrose 5%. 1000 milliLiter(s) (50 mL/Hr) IV Continuous <Continuous>  dextrose 5%. 1000 milliLiter(s) (100 mL/Hr) IV Continuous <Continuous>  dextrose 50% Injectable 25 Gram(s) IV Push once  dextrose 50% Injectable 12.5 Gram(s) IV Push once  dextrose 50% Injectable 25 Gram(s) IV Push once  glucagon  Injectable 1 milliGRAM(s) IntraMuscular once  heparin   Injectable 5000 Unit(s) SubCutaneous every 8 hours  insulin lispro (ADMELOG) corrective regimen sliding scale   SubCutaneous three times a day before meals  insulin lispro (ADMELOG) corrective regimen sliding scale   SubCutaneous at bedtime  pantoprazole    Tablet 40 milliGRAM(s) Oral before breakfast  senna 2 Tablet(s) Oral at bedtime  sodium chloride 0.9% lock flush 3 milliLiter(s) IV Push every 8 hours    MEDICATIONS  (PRN):  acetaminophen     Tablet .. 650 milliGRAM(s) Oral every 6 hours PRN Mild Pain (1 - 3)  aluminum hydroxide/magnesium hydroxide/simethicone Suspension 30 milliLiter(s) Oral every 6 hours PRN Dyspepsia  ketorolac   Injectable 15 milliGRAM(s) IV Push every 6 hours PRN Moderate/Severe Pain  ondansetron Injectable 4 milliGRAM(s) IV Push every 6 hours PRN Nausea and/or Vomiting      CAPILLARY BLOOD GLUCOSE      POCT Blood Glucose.: 132 mg/dL (03 Dec 2021 21:01)  POCT Blood Glucose.: 218 mg/dL (03 Dec 2021 17:38)  POCT Blood Glucose.: 131 mg/dL (03 Dec 2021 12:04)  POCT Blood Glucose.: 139 mg/dL (03 Dec 2021 08:37)    I&O's Summary      PHYSICAL EXAM:  Vital Signs Last 24 Hrs  T(C): 36.9 (04 Dec 2021 05:13), Max: 36.9 (04 Dec 2021 05:13)  T(F): 98.5 (04 Dec 2021 05:13), Max: 98.5 (04 Dec 2021 05:13)  HR: 65 (04 Dec 2021 05:13) (57 - 65)  BP: 151/71 (04 Dec 2021 05:13) (129/60 - 151/71)  BP(mean): --  RR: 18 (04 Dec 2021 05:13) (18 - 18)  SpO2: 100% (04 Dec 2021 05:13) (96% - 100%)    CONSTITUTIONAL: NAD, well-developed  RESPIRATORY: Normal respiratory effort; lungs are clear to auscultation bilaterally  CARDIOVASCULAR: Regular rate and rhythm, normal S1 and S2, no murmur/rub/gallop; No lower extremity edema; Peripheral pulses are 2+ bilaterally  ABDOMEN: Nontender to palpation, normoactive bowel sounds, no rebound/guarding; No hepatosplenomegaly  MUSCLOSKELETAL: no clubbing or cyanosis of digits; no joint swelling or tenderness to palpation  PSYCH: A+O to person, place, and time; affect appropriate    LABS:                        13.0   7.45  )-----------( 322      ( 03 Dec 2021 04:30 )             36.5     12-03    132<L>  |  98  |  16  ----------------------------<  101<H>  TNP   |  24  |  1.13    Ca    9.3      03 Dec 2021 04:30  Phos  TNP     12-03  Mg     2.00     12-03                  Tele Reviewed:    RADIOLOGY & ADDITIONAL TESTS:  Results Reviewed:   Imaging Personally Reviewed:  Electrocardiogram Personally Reviewed:     PROGRESS NOTE:   Authored by Dr. Valentine Ortiz MD (PGY-2). Pager [facility] [phone] / [facility] [id number]    Patient is a 61y old  Female who presents with a chief complaint of Pancreatitis (03 Dec 2021 14:38)      SUBJECTIVE / OVERNIGHT EVENTS:  No acute events overnight. Pt tolerated dinner well with some nausea but without vomiting. She states she does not have a ride home and daughter will not be back until later in the evening. We discussed possibility of transportation arranged by CM/SW.     ADDITIONAL REVIEW OF SYSTEMS:  Patient denies fevers, chills, chest pain, shortness of breath, abdominal pain, diarrhea, constipation, dysuria, leg swelling, headache, light headedness.    MEDICATIONS  (STANDING):  amLODIPine   Tablet 5 milliGRAM(s) Oral daily  atorvastatin 10 milliGRAM(s) Oral at bedtime  dextrose 40% Gel 15 Gram(s) Oral once  dextrose 5%. 1000 milliLiter(s) (50 mL/Hr) IV Continuous <Continuous>  dextrose 5%. 1000 milliLiter(s) (100 mL/Hr) IV Continuous <Continuous>  dextrose 50% Injectable 25 Gram(s) IV Push once  dextrose 50% Injectable 12.5 Gram(s) IV Push once  dextrose 50% Injectable 25 Gram(s) IV Push once  glucagon  Injectable 1 milliGRAM(s) IntraMuscular once  heparin   Injectable 5000 Unit(s) SubCutaneous every 8 hours  insulin lispro (ADMELOG) corrective regimen sliding scale   SubCutaneous three times a day before meals  insulin lispro (ADMELOG) corrective regimen sliding scale   SubCutaneous at bedtime  pantoprazole    Tablet 40 milliGRAM(s) Oral before breakfast  senna 2 Tablet(s) Oral at bedtime  sodium chloride 0.9% lock flush 3 milliLiter(s) IV Push every 8 hours    MEDICATIONS  (PRN):  acetaminophen     Tablet .. 650 milliGRAM(s) Oral every 6 hours PRN Mild Pain (1 - 3)  aluminum hydroxide/magnesium hydroxide/simethicone Suspension 30 milliLiter(s) Oral every 6 hours PRN Dyspepsia  ketorolac   Injectable 15 milliGRAM(s) IV Push every 6 hours PRN Moderate/Severe Pain  ondansetron Injectable 4 milliGRAM(s) IV Push every 6 hours PRN Nausea and/or Vomiting      CAPILLARY BLOOD GLUCOSE      POCT Blood Glucose.: 132 mg/dL (03 Dec 2021 21:01)  POCT Blood Glucose.: 218 mg/dL (03 Dec 2021 17:38)  POCT Blood Glucose.: 131 mg/dL (03 Dec 2021 12:04)  POCT Blood Glucose.: 139 mg/dL (03 Dec 2021 08:37)    I&O's Summary      PHYSICAL EXAM:  Vital Signs Last 24 Hrs  T(C): 36.9 (04 Dec 2021 05:13), Max: 36.9 (04 Dec 2021 05:13)  T(F): 98.5 (04 Dec 2021 05:13), Max: 98.5 (04 Dec 2021 05:13)  HR: 65 (04 Dec 2021 05:13) (57 - 65)  BP: 151/71 (04 Dec 2021 05:13) (129/60 - 151/71)  BP(mean): --  RR: 18 (04 Dec 2021 05:13) (18 - 18)  SpO2: 100% (04 Dec 2021 05:13) (96% - 100%)    CONSTITUTIONAL: NAD, well-developed  RESPIRATORY: Normal respiratory effort; lungs are clear to auscultation bilaterally  CARDIOVASCULAR: Regular rate and rhythm, normal S1 and S2, no murmur/rub/gallop; No lower extremity edema  ABDOMEN: Mild tenderness to palpation in epigastrium, normoactive bowel sounds, no rebound/guarding  MUSCLOSKELETAL: no clubbing or cyanosis of digits; no joint swelling or tenderness to palpation  PSYCH: A+O to person, place, and time; affect appropriate    LABS:                        13.0   7.45  )-----------( 322      ( 03 Dec 2021 04:30 )             36.5     12-03    132<L>  |  98  |  16  ----------------------------<  101<H>  TNP   |  24  |  1.13    Ca    9.3      03 Dec 2021 04:30  Phos  TNP     12-03  Mg     2.00     12-03

## 2021-12-04 NOTE — PROGRESS NOTE ADULT - PROBLEM SELECTOR PLAN 1
OSH Lipase 377 and CT-A/P graeme-pancreatic inflammatory changes c/w pancreatitis.   - CT-A/P w/ focal prominence and heterogeneity of pancreatic head and proximal body with associated faint peripancreatic inflammatory changes.   - 12/01 CT-A/P w/ no acute abnormality in the abdomen and pelvis. No evidence of pancreatitis.  - Lipase 377-->58  - RUQ U/S negative for cholelithiasis, cholecystitis.   - w/u etiology - no cholecystitis; minimal EtOH hx (1-glass of wine during holidays); calcium wnl; TG wnl; no recent ERCP; med-rec notable for HCTZ use, held    - d/c IVF  - pain: PO Tylenol q6hrs for mild pain; Toradol 15 mg IVP q6hrs for moderate/severe pain  - Regular, ?tolerating  - c/o nausea, c/w Zofran 4q6hrs PRN

## 2021-12-04 NOTE — PROGRESS NOTE ADULT - PROBLEM SELECTOR PLAN 5
f/u A1C outpatient. fs 140, 95, 174. On metformin, Glipizide, Pioglitazone at home  - med-rec assist by pharmacy  - hold oral antidiabetic agents  - low-dose ISS tid and qhs  - diet CC, DASH/TLC

## 2021-12-04 NOTE — PROGRESS NOTE ADULT - PROBLEM SELECTOR PLAN 6
DVT: SQH  Diet: Regular  Dispo:     Transitions of Care Status:  1.  Name of PCP:  2.  PCP Contacted on Admission: [ ] Y    [ ] N    3.  PCP contacted at Discharge: [ ] Y    [ ] N    [ ] N/A  4.  Post-Discharge Appointment Date and Location:  5.  Summary of Handoff given to PCP: DVT: SQH  Diet: Regular  Dispo: Home with cane (pt owns), and bedside PT?     Transitions of Care Status:  1.  Name of PCP:  2.  PCP Contacted on Admission: [ ] Y    [ ] N    3.  PCP contacted at Discharge: [ ] Y    [ ] N    [ ] N/A  4.  Post-Discharge Appointment Date and Location:  5.  Summary of Handoff given to PCP: DVT: SQH  Diet: Regular  Dispo: Home with cane (pt owns), and outpt PT    Transitions of Care Status:  1.  Name of PCP:  2.  PCP Contacted on Admission: [ ] Y    [ ] N    3.  PCP contacted at Discharge: [ ] Y    [ ] N    [ ] N/A  4.  Post-Discharge Appointment Date and Location:  5.  Summary of Handoff given to PCP:

## 2021-12-04 NOTE — DISCHARGE NOTE NURSING/CASE MANAGEMENT/SOCIAL WORK - PATIENT PORTAL LINK FT
You can access the FollowMyHealth Patient Portal offered by [facility] by registering at the following website: [email or web address]. By joining [employer]’s FollowMyHealth portal, you will also be able to view your health information using other applications (apps) compatible with our system.

## 2021-12-15 ENCOUNTER — RX RENEWAL (OUTPATIENT)
Age: 61
End: 2021-12-15

## 2021-12-17 ENCOUNTER — APPOINTMENT (OUTPATIENT)
Dept: PAIN MANAGEMENT | Facility: CLINIC | Age: 61
End: 2021-12-17
Payer: MEDICARE

## 2021-12-17 VITALS
DIASTOLIC BLOOD PRESSURE: 91 MMHG | BODY MASS INDEX: 37.33 KG/M2 | HEIGHT: 69 IN | HEART RATE: 95 BPM | WEIGHT: 252 LBS | SYSTOLIC BLOOD PRESSURE: 186 MMHG

## 2021-12-17 PROCEDURE — 99213 OFFICE O/P EST LOW 20 MIN: CPT

## 2021-12-17 RX ORDER — NORTRIPTYLINE HYDROCHLORIDE 25 MG/1
25 CAPSULE ORAL TWICE DAILY
Qty: 60 | Refills: 3 | Status: ACTIVE | COMMUNITY
Start: 2021-05-19 | End: 1900-01-01

## 2021-12-17 NOTE — PHYSICAL EXAM
[General Appearance - Alert] : alert [General Appearance - Well-Appearing] : healthy appearing [Oriented To Time, Place, And Person] : oriented to person, place, and time [Affect] : the affect was normal [Mood] : the mood was normal [Cranial Nerves Facial (VII)] : face symmetrical [Cranial Nerves Accessory (XI - Cranial And Spinal)] : head turning and shoulder shrug symmetric [Cranial Nerves Hypoglossal (XII)] : there was no tongue deviation with protrusion [Motor Strength] : muscle strength was normal in all four extremities [Sclera] : the sclera and conjunctiva were normal [PERRL With Normal Accommodation] : pupils were equal in size, round, reactive to light, with normal accommodation [Extraocular Movements] : extraocular movements were intact [] : no respiratory distress [Paresis Pronator Drift Right-Sided] : no pronator drift on the right [Paresis Pronator Drift Left-Sided] : no pronator drift on the left [Motor Strength Upper Extremities Bilaterally] : strength was normal in both upper extremities [Motor Strength Lower Extremities Bilaterally] : strength was normal in both lower extremities [Coordination - Dysmetria Impaired Finger-to-Nose Bilateral] : not present

## 2021-12-17 NOTE — ASSESSMENT
[FreeTextEntry1] : 62 y/o F with chronic Migraines somewhat improved with Nortriptyline \par \par - Increase Nortriptyline to 50 mg q hs \par continue Magnesium \par \par I am seeing [facility] as incident to service. Dr. Macario is present in the office suite immediately available and able to provide assistance and direction throughout the time the service was performed.\par

## 2021-12-17 NOTE — HISTORY OF PRESENT ILLNESS
[FreeTextEntry1] : 62y/o F with Pmhx HTN, DM2 , SDH 2018 , recently hospitalized with pancreatitis who presents today for follow up for headaches. Prior to her last visit she was having constant headaches.  Since she increased the Nortriptyline to 20 mg BID which decreased the intensity and frequency of her headaches. She has been out of meds x 2 weeks.  She is now having headaches every other day described as a sharp pain on the top of her head 7/10 lasting 4-5 hours on average associated with sens to light and noise. \par \par She smokes marijuana which she feels helps and is inquiring about MM. \par Current meds: Nortriptyline 20 mg BID

## 2022-01-05 RX ORDER — NORTRIPTYLINE HYDROCHLORIDE 10 MG/1
10 CAPSULE ORAL
Qty: 120 | Refills: 3 | Status: ACTIVE | COMMUNITY
Start: 2021-06-10 | End: 1900-01-01

## 2022-05-23 ENCOUNTER — APPOINTMENT (OUTPATIENT)
Dept: PAIN MANAGEMENT | Facility: CLINIC | Age: 62
End: 2022-05-23

## 2022-06-24 ENCOUNTER — APPOINTMENT (OUTPATIENT)
Dept: PAIN MANAGEMENT | Facility: CLINIC | Age: 62
End: 2022-06-24

## 2022-09-06 ENCOUNTER — EMERGENCY (EMERGENCY)
Facility: HOSPITAL | Age: 62
LOS: 0 days | Discharge: HOME | End: 2022-09-06
Attending: STUDENT IN AN ORGANIZED HEALTH CARE EDUCATION/TRAINING PROGRAM

## 2022-09-06 VITALS
RESPIRATION RATE: 17 BRPM | TEMPERATURE: 98 F | WEIGHT: 250 LBS | SYSTOLIC BLOOD PRESSURE: 179 MMHG | DIASTOLIC BLOOD PRESSURE: 79 MMHG | HEART RATE: 81 BPM | OXYGEN SATURATION: 97 % | HEIGHT: 69 IN

## 2022-09-06 VITALS
SYSTOLIC BLOOD PRESSURE: 164 MMHG | DIASTOLIC BLOOD PRESSURE: 72 MMHG | HEART RATE: 83 BPM | OXYGEN SATURATION: 98 % | RESPIRATION RATE: 18 BRPM

## 2022-09-06 DIAGNOSIS — Z98.84 BARIATRIC SURGERY STATUS: Chronic | ICD-10-CM

## 2022-09-06 DIAGNOSIS — M19.90 UNSPECIFIED OSTEOARTHRITIS, UNSPECIFIED SITE: ICD-10-CM

## 2022-09-06 DIAGNOSIS — E78.5 HYPERLIPIDEMIA, UNSPECIFIED: ICD-10-CM

## 2022-09-06 DIAGNOSIS — M79.89 OTHER SPECIFIED SOFT TISSUE DISORDERS: ICD-10-CM

## 2022-09-06 DIAGNOSIS — Z90.710 ACQUIRED ABSENCE OF BOTH CERVIX AND UTERUS: ICD-10-CM

## 2022-09-06 DIAGNOSIS — Z88.0 ALLERGY STATUS TO PENICILLIN: ICD-10-CM

## 2022-09-06 DIAGNOSIS — Z98.51 TUBAL LIGATION STATUS: ICD-10-CM

## 2022-09-06 DIAGNOSIS — I10 ESSENTIAL (PRIMARY) HYPERTENSION: ICD-10-CM

## 2022-09-06 DIAGNOSIS — E11.9 TYPE 2 DIABETES MELLITUS WITHOUT COMPLICATIONS: ICD-10-CM

## 2022-09-06 DIAGNOSIS — Z98.890 OTHER SPECIFIED POSTPROCEDURAL STATES: Chronic | ICD-10-CM

## 2022-09-06 LAB
ALBUMIN SERPL ELPH-MCNC: 3.6 G/DL — SIGNIFICANT CHANGE UP (ref 3.3–5)
ALP SERPL-CCNC: 87 U/L — SIGNIFICANT CHANGE UP (ref 40–120)
ALT FLD-CCNC: 27 U/L — SIGNIFICANT CHANGE UP (ref 12–78)
ANION GAP SERPL CALC-SCNC: 5 MMOL/L — SIGNIFICANT CHANGE UP (ref 5–17)
AST SERPL-CCNC: 19 U/L — SIGNIFICANT CHANGE UP (ref 15–37)
BASOPHILS # BLD AUTO: 0.04 K/UL — SIGNIFICANT CHANGE UP (ref 0–0.2)
BASOPHILS NFR BLD AUTO: 0.6 % — SIGNIFICANT CHANGE UP (ref 0–2)
BILIRUB SERPL-MCNC: 0.4 MG/DL — SIGNIFICANT CHANGE UP (ref 0.2–1.2)
BUN SERPL-MCNC: 18 MG/DL — SIGNIFICANT CHANGE UP (ref 7–23)
CALCIUM SERPL-MCNC: 9.3 MG/DL — SIGNIFICANT CHANGE UP (ref 8.5–10.1)
CHLORIDE SERPL-SCNC: 111 MMOL/L — HIGH (ref 96–108)
CO2 SERPL-SCNC: 28 MMOL/L — SIGNIFICANT CHANGE UP (ref 22–31)
CREAT SERPL-MCNC: 1.21 MG/DL — SIGNIFICANT CHANGE UP (ref 0.5–1.3)
EGFR: 51 ML/MIN/1.73M2 — LOW
EOSINOPHIL # BLD AUTO: 0.12 K/UL — SIGNIFICANT CHANGE UP (ref 0–0.5)
EOSINOPHIL NFR BLD AUTO: 1.8 % — SIGNIFICANT CHANGE UP (ref 0–6)
GLUCOSE SERPL-MCNC: 146 MG/DL — HIGH (ref 70–99)
HCT VFR BLD CALC: 37.3 % — SIGNIFICANT CHANGE UP (ref 34.5–45)
HGB BLD-MCNC: 12.1 G/DL — SIGNIFICANT CHANGE UP (ref 11.5–15.5)
IMM GRANULOCYTES NFR BLD AUTO: 0.6 % — SIGNIFICANT CHANGE UP (ref 0–1.5)
LYMPHOCYTES # BLD AUTO: 1.91 K/UL — SIGNIFICANT CHANGE UP (ref 1–3.3)
LYMPHOCYTES # BLD AUTO: 28 % — SIGNIFICANT CHANGE UP (ref 13–44)
MCHC RBC-ENTMCNC: 28.9 PG — SIGNIFICANT CHANGE UP (ref 27–34)
MCHC RBC-ENTMCNC: 32.4 G/DL — SIGNIFICANT CHANGE UP (ref 32–36)
MCV RBC AUTO: 89 FL — SIGNIFICANT CHANGE UP (ref 80–100)
MONOCYTES # BLD AUTO: 0.7 K/UL — SIGNIFICANT CHANGE UP (ref 0–0.9)
MONOCYTES NFR BLD AUTO: 10.3 % — SIGNIFICANT CHANGE UP (ref 2–14)
NEUTROPHILS # BLD AUTO: 4 K/UL — SIGNIFICANT CHANGE UP (ref 1.8–7.4)
NEUTROPHILS NFR BLD AUTO: 58.7 % — SIGNIFICANT CHANGE UP (ref 43–77)
NRBC # BLD: 0 /100 WBCS — SIGNIFICANT CHANGE UP (ref 0–0)
PLATELET # BLD AUTO: 281 K/UL — SIGNIFICANT CHANGE UP (ref 150–400)
POTASSIUM SERPL-MCNC: 4.5 MMOL/L — SIGNIFICANT CHANGE UP (ref 3.5–5.3)
POTASSIUM SERPL-SCNC: 4.5 MMOL/L — SIGNIFICANT CHANGE UP (ref 3.5–5.3)
PROT SERPL-MCNC: 8.3 GM/DL — SIGNIFICANT CHANGE UP (ref 6–8.3)
RBC # BLD: 4.19 M/UL — SIGNIFICANT CHANGE UP (ref 3.8–5.2)
RBC # FLD: 14.7 % — HIGH (ref 10.3–14.5)
SODIUM SERPL-SCNC: 144 MMOL/L — SIGNIFICANT CHANGE UP (ref 135–145)
WBC # BLD: 6.81 K/UL — SIGNIFICANT CHANGE UP (ref 3.8–10.5)
WBC # FLD AUTO: 6.81 K/UL — SIGNIFICANT CHANGE UP (ref 3.8–10.5)

## 2022-09-06 PROCEDURE — 93970 EXTREMITY STUDY: CPT | Mod: 26

## 2022-09-06 PROCEDURE — 99285 EMERGENCY DEPT VISIT HI MDM: CPT

## 2022-09-06 RX ORDER — ATORVASTATIN CALCIUM 80 MG/1
1 TABLET, FILM COATED ORAL
Qty: 0 | Refills: 0 | DISCHARGE

## 2022-09-06 RX ORDER — MELOXICAM 15 MG/1
1 TABLET ORAL
Qty: 0 | Refills: 0 | DISCHARGE

## 2022-09-06 RX ORDER — PIOGLITAZONE HYDROCHLORIDE 15 MG/1
1 TABLET ORAL
Qty: 0 | Refills: 0 | DISCHARGE

## 2022-09-06 RX ORDER — METFORMIN HYDROCHLORIDE 850 MG/1
1 TABLET ORAL
Qty: 0 | Refills: 0 | DISCHARGE

## 2022-09-06 RX ORDER — NORTRIPTYLINE HYDROCHLORIDE 10 MG/1
2 CAPSULE ORAL
Qty: 0 | Refills: 0 | DISCHARGE

## 2022-09-06 RX ORDER — ACETAMINOPHEN 500 MG
975 TABLET ORAL ONCE
Refills: 0 | Status: COMPLETED | OUTPATIENT
Start: 2022-09-06 | End: 2022-09-06

## 2022-09-06 RX ORDER — PANTOPRAZOLE SODIUM 20 MG/1
1 TABLET, DELAYED RELEASE ORAL
Qty: 0 | Refills: 0 | DISCHARGE

## 2022-09-06 RX ORDER — ATENOLOL 25 MG/1
1 TABLET ORAL
Qty: 0 | Refills: 0 | DISCHARGE

## 2022-09-06 RX ADMIN — Medication 975 MILLIGRAM(S): at 13:28

## 2022-09-06 NOTE — ED PROVIDER NOTE - NS ED ATTENDING STATEMENT MOD
This was a shared visit with the SMITH. I reviewed and verified the documentation and independently performed the documented:

## 2022-09-06 NOTE — ED PROVIDER NOTE - NSICDXPASTSURGICALHX_GEN_ALL_CORE_FT
PAST SURGICAL HISTORY:  H/O:      History of Back Surgery 2004    History of Hysterectomy 2000 because of fibroids    History of Oophorectomy  because of cysts    History of Rotator Cuff Tear L shoulder     S/P T&A childhood    Tubal Ligation 1993

## 2022-09-06 NOTE — ED PROVIDER NOTE - CARE PROVIDER_API CALL
Darien Mayorga)  Surgery  [address], [address]  [place], [place] [id number]  Phone: [phone]  Fax: [phone]  Follow Up Time:

## 2022-09-06 NOTE — ED ADULT NURSE REASSESSMENT NOTE - NS ED NURSE REASSESS COMMENT FT1
Patient taken to ultrasound at this time. patient left the unit in a stable condition. no acute or respiratory distress noted. patient in stretcher. son at bedside. no acute or respiratory distress noted.

## 2022-09-06 NOTE — ED PROVIDER NOTE - PHYSICAL EXAMINATION
Gen: Awake, Alert, NAD  Head:  NC/AT  Eyes:  PERRL, EOMI, Conjunctiva pink, lids normal, no scleral icterus  ENT: OP clear, no exudates, moist mucus membranes  Neck: supple, nontender, no meningismus, no JVD, trachea midline  Cardiac/CV:  S1 S2, RRR, no M/G/R  Respiratory/Pulm:  CTAB, good air movement, normal resp effort, no wheezes/stridor/retractions/rales/rhonchi  Gastrointestinal/Abdomen:  Soft, non tender , nondistended, +BS, no rebound/guarding  Back:  no CVAT,  no MLT  Ext:  warm, well perfused, moving all extremities spontaneously, bilateral +2  peripheral edema with discoloration, TTP to bilateral LE, distal pulses intact  Skin: intact, no rash  Neuro:  AAOx3, sensation intact, motor 5/5 x 4 extremities, normal gait, speech clear

## 2022-09-06 NOTE — ED PROVIDER NOTE - ATTENDING APP SHARED VISIT CONTRIBUTION OF CARE
61 y/o F pmhx HTN, DM, HLD c/o bilateral leg swelling and soreness x 1 week. States she is concerned because there is some discoloration of the skin. She denies CP, SOB, N/V, fever, or chills.    ROS negative as per HPI    GENERAL: Awake, alert, NAD  HEENT: NC/AT, moist mucous membranes  LUNGS: CTAB, no wheezes or crackles   CARDIAC: RRR, no m/r/g  ABDOMEN: Soft, normal BS, non tender, non distended, no rebound, no guarding  EXT: 2+ pitting edema bilaterally, no calf tenderness, 2+ DP pulses bilaterally, no deformities.  NEURO: A&Ox3. Moving all extremities.  SKIN: Warm and dry. No rash.  PSYCH: Normal affect.    MDM: 61 y/o F with PMH HTN, DM, HLD presenting to the ED c/o bilateral lower extremity swelling and discomfort. Vitals stable. Exam with b/l pitting edema. Will obtain labs and duplex b/l lower extremities. Low suspicion for CHF.

## 2022-09-06 NOTE — ED ADULT NURSE NOTE - OBJECTIVE STATEMENT
patient is A&Ox4. Breathing unlabored on RA. PMH HTN, DM, HLD, h/o aneurysm. Complaining of bilateral lower leg swelling x one week. complaining of soreness and pain to touch. denies any cp, SOB, difficulty breathing, abdominal pain, fever, chills. denies any smoking or drinking. denies any recent travel or sitting for a long period of time. reports ambulating with cane. Denies any other symptoms at this time.

## 2022-09-06 NOTE — ED ADULT NURSE NOTE - NSIMPLEMENTINTERV_GEN_ALL_ED
Implemented All Fall Risk Interventions:  [place] to call system. Call bell, personal items and telephone within reach. Instruct patient to call for assistance. Room bathroom lighting operational. Non-slip footwear when patient is off stretcher. Physically safe environment: no spills, clutter or unnecessary equipment. Stretcher in lowest position, wheels locked, appropriate side rails in place. Provide visual cue, wrist band, yellow gown, etc. Monitor gait and stability. Monitor for mental status changes and reorient to person, place, and time. Review medications for side effects contributing to fall risk. Reinforce activity limits and safety measures with patient and family.

## 2022-09-06 NOTE — ED PROVIDER NOTE - OBJECTIVE STATEMENT
63 y/o F pmhx HTN, DM, HLD c/o bilateral leg swelling and soreness x 1 week. she denies chest pain, sob, fever, chills, orthopnea. no alleviating or aggravating factors. denies recent travel. denies smoking cigarettes,.

## 2022-09-06 NOTE — ED PROVIDER NOTE - PATIENT PORTAL LINK FT
You can access the FollowMyHealth Patient Portal offered by [facility] by registering at the following website: [email or web address]. By joining [employer]’s FollowMyHealth portal, you will also be able to view your health information using other applications (apps) compatible with our system.

## 2022-09-06 NOTE — ED ADULT NURSE NOTE - HIV OFFER
Patients family called requesting assistance       MD Marj Steward [id number]  [phone]  Apt schedule March 31st @4:40 Opt out

## 2022-09-06 NOTE — ED ADULT NURSE NOTE - NSICDXPASTMEDICALHX_GEN_ALL_CORE_FT
PAST MEDICAL HISTORY:  DM (diabetes mellitus)     H/O aneurysm     History of Osteoarthritis     HLD (hyperlipidemia)     HTN (Hypertension)

## 2022-09-06 NOTE — ED PROVIDER NOTE - CLINICAL SUMMARY MEDICAL DECISION MAKING FREE TEXT BOX
63 y/o F pmhx HTN, DM, HLD c/o bilateral leg swelling and soreness x 1 week. she denies chest pain, sob, fever, chills, orthopnea. no alleviating or aggravating factors. denies recent travel. denies smoking cigarettes,. -- bilateral +2 edema with discoloration. otherwise normal exam. ciara JENKINS, will check labs and doppler LE

## 2022-09-06 NOTE — ED PROVIDER NOTE - NSICDXPASTMEDICALHX_GEN_ALL_CORE_FT
PAST MEDICAL HISTORY:  DM (diabetes mellitus)     History of Osteoarthritis     HLD (hyperlipidemia)     HTN (Hypertension)

## 2022-09-06 NOTE — ED PROVIDER NOTE - NSFOLLOWUPINSTRUCTIONS_ED_ALL_ED_FT
December 10, 2020       Tawanda Orourke  [address] [place] [id number]      EGD Instructions  Enedina Crowley MD  [phone]    Date of Procedure: 01/25/21  Arrive to [facility] at: 8:45 AM  LOCATION:  [address]; [place] [address]  Enter through the main entrance, next to the Emergency Department. Stop by the Admitting Desk inside the entrance. Take the [facility] Elevators to the 2nd floor. GI Services - 2nd floor [facility]. GI Lab phone number: [phone].    Read These Entire Instructions as Soon as You Receive Them    PATIENT CHECKLIST    7 Days prior to your procedure  · Avoid Advil, ibuprofen, Aleve, naproxen, etc.   · If you are on Plavix, please follow the instructions discussed at your clinic visit. If any questions call the office for more information.       5 Days before your procedure  · If you are on Coumadin, hold it as instructed at your visit. If any questions call the office for more information.  · Confirm you have a  to take you home following the procedure. You cannot take a taxi cab, bus, or public transport home alone.     1 Day before your procedure  · Nothing to eat or drink after midnight.  · No alcohol     Day of the procedure  · Nothing by mouth after midnight.  · You may take meds with a sip of water up to 3 hours before your procedure.   · NOTHING BY MOUTH 3 HOURS BEFORE YOUR PROCEDURE    If you have any questions about the process, please call the office [phone].    SPECIAL INSTRUCTIONS    · Diabetics - Contact your physician or my office for instructions on your diabetic medications, including insulin. In general, hold your diabetic medications the day of the procedure.   · Blood Thinners - Including dabigatran (Pradaxa), clopidogril (Plavix), warfarin (Coumadin), dipyridamole (Persantine), rivaroxaban (Xarelto). Please contact my office if you are on these medications for instructions.     FREQUENTLY ASKED QUESTIONS    What is  an EGD?   · An esophagogastroduodenoscopy (EGD) is a procedure where we can examine your upper GI tract (esophagus, stomach, and a short segment of your small intestine) for abnormalities. A flexible tube that has a camera and light at the tip and as thick as your pinkie is used in the exam.     Can I take all my medications?  · Most medications can be continued without problems. However please contact my office if you take diabetic medications, blood thinners such as Plavix, Coumadin, warfarin, aspirin, arthritis medications, non-steroidal anti-inflammatories (ibuprofen, Motrin etc.), or iron supplements.    What can I expect the day of the procedure?  · You will arrive at the facility where you are scheduled. We have you arrive early since you will need to fill out your information. An IV will be placed so we can give you medications. Once in the procedure room, I will talk to you and answer any questions you may have. The back of your throat will be numbed with a solution. Sedation will then be started to help you relax. This is called \"conscious sedation\".   · You will usually lie on your left side. I will ask you to take a big swallow to help guide the scope into the esophagus. I will then advance the scope into your esophagus, stomach and short portion of your small intestine. During the procedure it is normal during the procedure to feel some pressure. Then entire procedure takes approximately 5 minutes, however this can be prolonged in complicated cases.  · Once the procedure is complete, you will be brought to the recovery room until you are stable to leave. You should plan on being at the procedure site for 2 to 3 hours.     Will I be completely sedated for the procedure?  · In most cases you will be receiving conscious sedation, meaning that you will be in a “twilight sleep”. You will breathe on your own and will be able to follow commands.   · The procedure usually is not painful, however you may have  discomfort with having the scope in the back of your throat.  The goal of the sedation is to keep you as comfortable as possible.   · Patients that have a history of taking chronic medications such as pain medication, anti-anxiety medications or alcohol use may build up a tolerance to the sedation. This may make it difficult or impossible to fully sedate you for the procedure.   · This procedure can usually also be performed without sedation, since the procedure is not painful and is short in duration.     What if something abnormal is found during the EGD?  · Biopsies, tissue samples, can be obtained during the exam.     What will happen after the procedure?  · I will discuss the findings with you. If tissue samples/polyps are removed, I will give you a call in approximately 1 week with the results. If you do not hear from me in 2 weeks, you can call my office for results.   · Even if you feel alert after the procedure, your judgment and reflexes may be impaired the rest of the day. You should not drive, work machinery or perform any other task that requires your full attention.  · It is common to have a sore throat after the procedure. This usually will resolve after a couple of days. To help, you can take Tylenol or throat lozenges.  · Normally you may resume a regular diet after the procedure is completed. If you are on a blood thinner, this may be held if large biopsies are obtained.    Why do I need some one to accompany me to the exam?  · Because you are given a sedative, you need someone you know to drive you home after the exam. If you are taking a bus, taxi or van service a responsible adult you know must accompany you. If this is a problem, the EGD can be attempted without any sedation.     What are the complications of the exam?  · EGDs are relatively safe, however complications can occur.  Some but not all of the risk are discussed below. Some patients may have an adverse reaction with the sedation or  complications from heart and lung disease. There is also the risk of causing bleeding and perforation (poking a hole in the GI tract). If these complications do occur, they may need surgical treatment. Also all of the walls cannot be fully visualized, and lesions can be missed.   · After the procedure, if you have any abdominal pain, fever, chills, nausea, vomiting, black or bright red blood in your stools it is important to notify me or seek immediate medical attention.         Advance activity as tolerated.  Continue all previously prescribed medications as directed unless otherwise instructed.  Follow up with your primary care physician and the vascular doctor in 48-72 hours- bring copies of your results.      Return to the ER for worsening or persistent symptoms, and/or ANY NEW OR CONCERNING SYMPTOMS. If you have issues obtaining follow up, please call: [phone] to obtain a doctor or specialist who takes your insurance in your area.  You may call [phone] to make an appointment with the internal medicine clinic.     Please keep legs elevated when you can.     You can also purchase compression stockings to help with the circulation in your legs.     Take Tylenol 650mg (Two 325 mg pills) every 4-6 hours as needed for pain

## 2024-04-02 PROBLEM — I10 ESSENTIAL (PRIMARY) HYPERTENSION: Chronic | Status: ACTIVE | Noted: 2017-12-23

## 2024-04-02 PROBLEM — E78.00 PURE HYPERCHOLESTEROLEMIA, UNSPECIFIED: Chronic | Status: ACTIVE | Noted: 2017-12-23

## 2024-04-02 PROBLEM — E13.9 OTHER SPECIFIED DIABETES MELLITUS WITHOUT COMPLICATIONS: Chronic | Status: ACTIVE | Noted: 2017-12-23

## 2024-04-14 NOTE — PATIENT PROFILE ADULT - LIVING ENVIRONMENT
Airway    Date/Time: 4/14/2024 12:21 AM    Performed by: Hari Vazquez M.D.  Authorized by: Hari Vazquez M.D.    Location:  OR  Urgency:  Elective  Indications for Airway Management:  Anesthesia      Spontaneous Ventilation: absent    Sedation Level:  Deep  Preoxygenated: Yes    Patient Position:  Sniffing  Final Airway Type:  Endotracheal airway  Final Endotracheal Airway:  ETT  Cuffed: Yes    Technique Used for Successful ETT Placement:  Direct laryngoscopy    Insertion Site:  Oral  Blade Type:  Cheli  Laryngoscope Blade/Videolaryngoscope Blade Size:  3  ETT Size (mm):  7.0  Measured from:  Teeth  ETT to Teeth (cm):  22  Placement Verified by: capnometry    Cormack-Lehane Classification:  Grade I - full view of glottis  Number of Attempts at Approach:  1         no

## 2025-05-12 NOTE — PATIENT PROFILE ADULT - STATED REASON FOR ADMISSION
Patient complete bed bath given upon arrival to floor, patient laying in feces and urine.    Weber cath unable to be obtained by nursing staff and urology.  to follow up in am, may have to take pt to OR to obtain.    Tele psych on with pt in room now.    Wound care complete, see media for all wounds.    " I came because of abdominal pain"